# Patient Record
Sex: FEMALE | Race: WHITE
[De-identification: names, ages, dates, MRNs, and addresses within clinical notes are randomized per-mention and may not be internally consistent; named-entity substitution may affect disease eponyms.]

---

## 2018-10-01 ENCOUNTER — HOSPITAL ENCOUNTER (EMERGENCY)
Dept: HOSPITAL 97 - ER | Age: 73
Discharge: ANOTHER HOSPITAL | End: 2018-10-01
Payer: COMMERCIAL

## 2018-10-01 VITALS — OXYGEN SATURATION: 96 % | SYSTOLIC BLOOD PRESSURE: 137 MMHG | DIASTOLIC BLOOD PRESSURE: 53 MMHG

## 2018-10-01 VITALS — TEMPERATURE: 98.2 F

## 2018-10-01 DIAGNOSIS — Z95.818: ICD-10-CM

## 2018-10-01 DIAGNOSIS — Z88.8: ICD-10-CM

## 2018-10-01 DIAGNOSIS — I10: ICD-10-CM

## 2018-10-01 DIAGNOSIS — H81.49: Primary | ICD-10-CM

## 2018-10-01 LAB
ALBUMIN SERPL BCP-MCNC: 3.4 G/DL (ref 3.4–5)
ALP SERPL-CCNC: 99 U/L (ref 45–117)
ALT SERPL W P-5'-P-CCNC: 18 U/L (ref 12–78)
AST SERPL W P-5'-P-CCNC: 16 U/L (ref 15–37)
BUN BLD-MCNC: 67 MG/DL (ref 7–18)
GLUCOSE SERPLBLD-MCNC: 283 MG/DL (ref 74–106)
HCT VFR BLD CALC: 44.9 % (ref 36–45)
LYMPHOCYTES # SPEC AUTO: 2.9 K/UL (ref 0.7–4.9)
MCH RBC QN AUTO: 31.1 PG (ref 27–35)
MCV RBC: 91.1 FL (ref 80–100)
PMV BLD: 9.4 FL (ref 7.6–11.3)
POTASSIUM SERPL-SCNC: 5.1 MMOL/L (ref 3.5–5.1)
RBC # BLD: 4.92 M/UL (ref 3.86–4.86)
UA COMPLETE W REFLEX CULTURE PNL UR: (no result)

## 2018-10-01 PROCEDURE — 81003 URINALYSIS AUTO W/O SCOPE: CPT

## 2018-10-01 PROCEDURE — 96361 HYDRATE IV INFUSION ADD-ON: CPT

## 2018-10-01 PROCEDURE — 87186 SC STD MICRODIL/AGAR DIL: CPT

## 2018-10-01 PROCEDURE — 85025 COMPLETE CBC W/AUTO DIFF WBC: CPT

## 2018-10-01 PROCEDURE — 96374 THER/PROPH/DIAG INJ IV PUSH: CPT

## 2018-10-01 PROCEDURE — 70450 CT HEAD/BRAIN W/O DYE: CPT

## 2018-10-01 PROCEDURE — 36415 COLL VENOUS BLD VENIPUNCTURE: CPT

## 2018-10-01 PROCEDURE — 81015 MICROSCOPIC EXAM OF URINE: CPT

## 2018-10-01 PROCEDURE — 87086 URINE CULTURE/COLONY COUNT: CPT

## 2018-10-01 PROCEDURE — 87088 URINE BACTERIA CULTURE: CPT

## 2018-10-01 PROCEDURE — 83605 ASSAY OF LACTIC ACID: CPT

## 2018-10-01 PROCEDURE — 80053 COMPREHEN METABOLIC PANEL: CPT

## 2018-10-01 PROCEDURE — 99285 EMERGENCY DEPT VISIT HI MDM: CPT

## 2018-10-01 PROCEDURE — 87077 CULTURE AEROBIC IDENTIFY: CPT

## 2018-10-01 NOTE — RAD REPORT
EXAM DESCRIPTION:  CT - Head Brain Wo Cont - 10/1/2018 11:57 am

 

CLINICAL HISTORY:  vertigo

Drowsiness.

 

COMPARISON:  No comparisons

 

TECHNIQUE:  All CT scans are performed using dose optimization technique as appropriate and may inclu
de automated exposure control or mA/KV adjustment according to patient size.

 

FINDINGS:  No intracranial hemorrhage, hydrocephalus or extra-axial fluid collection.No areas of brai
n edema or evidence of midline shift.

 

The paranasal sinuses and mastoids are clear. The calvarium is intact. Vertebral atherosclerosis.

 

IMPRESSION:  No acute intracranial abnormality.

## 2018-10-01 NOTE — ER
Nurse's Notes                                                                                     

 [facility]                                                                

Name: Lulu Fowler                                                                                

Age: 73 yrs                                                                                       

Sex: Female                                                                                       

: 1945                                                                                   

MRN: [id number]                                                                                   

Arrival Date: 10/01/2018                                                                          

Time: 10:15                                                                                       

Account#: [id number]                                                                            

Bed 8                                                                                             

Private MD: Cary Calvo Atiq                                                                  

Diagnosis: Vertigo of central origin                                                              

                                                                                                  

Presentation:                                                                                     

10/01                                                                                             

10:19 Presenting complaint: Patient states: Dizziness and generalized weakness x 3 days.      hb  

      Dizziness is worse when changing positions. Home . Denies pain/SOB/nausea/cough.     

      Transition of care: patient was not received from another setting of care. Onset of         

      symptoms was 2018. Risk Assessment: Do you want to hurt yourself or           

      someone else? Patient reports no desire to harm self or others.                             

10:19 Method Of Arrival: Wheelchair                                                           hb  

10:19 Acuity: JAMAL 3                                                                           hb  

11:56 Initial Sepsis Screen: Does the patient meet any 2 criteria? No. Patient's initial      sv  

      sepsis screen is negative. Does the patient have a suspected source of infection? No.       

      Patient's initial sepsis screen is negative.                                                

11:56 Care prior to arrival: None.                                                            sv  

                                                                                                  

Historical:                                                                                       

- Allergies:                                                                                      

10:21 Statins-Hmg-Coa Reductase Inhibitors;                                                   hb  

- PMHx:                                                                                           

10:21 CAD; Diabetes - IDDM; Gout; Hypertension; Pneumonia;                                    hb  

- PSHx:                                                                                           

10:21 Appendectomy; Tubal ligation; abdominal mass removal; back surgery; Heart stents;       hb  

      Tonsillectomy; cataract surgery bilaterally;                                                

                                                                                                  

- Immunization history:: Adult Immunizations up to date.                                          

- Social history:: Smoking status: Patient/guardian denies using tobacco,                         

  Patient/guardian denies using alcohol, street drugs, The patient lives with family, .           

- Ebola Screening: : No symptoms or risks identified at this time.                                

- Family history:: not pertinent.                                                                 

                                                                                                  

                                                                                                  

Screenin:55 Abuse screen: Denies threats or abuse. Denies injuries from another. Nutritional        sv  

      screening: No deficits noted. Tuberculosis screening: No symptoms or risk factors           

      identified. Fall Risk None identified.                                                      

                                                                                                  

Assessment:                                                                                       

12:10 General: Appears in no apparent distress. uncomfortable, obese, well developed,         sv  

      Behavior is cooperative, appropriate for age, anxious. Pain: Denies pain. Neuro: Level      

      of Consciousness is awake, alert, obeys commands, Oriented to person, place, time,          

      situation, Moves all extremities. Full function Speech is normal, Facial symmetry           

      appears normal, Reports dizziness, since 3 days weakness since 3 days. Cardiovascular:      

      Patient's skin is warm and dry. Pulses are 3+ in right radial artery and left radial        

      artery. Respiratory: Respiratory effort is even, unlabored, Respiratory pattern is          

      regular, symmetrical. Derm: Skin is pink, warm \T\ dry. Musculoskeletal: Range of motion:   

      intact in all extremities.                                                                  

14:05 Reassessment: Patient appears in no apparent distress at this time. No changes from     sv  

      previously documented assessment. Patient and/or family updated on plan of care and         

      expected duration. Pain level reassessed. Patient is alert, oriented x 3, equal             

      unlabored respirations, skin warm/dry/pink.                                                 

15:40 Reassessment: Patient appears in no apparent distress at this time. No changes from     sv  

      previously documented assessment. Patient and/or family updated on plan of care and         

      expected duration. Pain level reassessed. Patient is alert, oriented x 3, equal             

      unlabored respirations, skin warm/dry/pink.                                                 

16:19 Reassessment: Patient appears in no apparent distress at this time. No changes from     sv  

      previously documented assessment. Patient and/or family updated on plan of care and         

      expected duration. Pain level reassessed. Patient is alert, oriented x 3, equal             

      unlabored respirations, skin warm/dry/pink.                                                 

                                                                                                  

Vital Signs:                                                                                      

10:21  / 86; Pulse 73; Resp 16; Temp 98.2; Pulse Ox 100% on R/A; Weight 105.23 kg;      hb  

      Height 5 ft. 3 in. (160.02 cm); Pain 0/10;                                                  

12:15  / 75; Pulse 65; Resp 18; Pulse Ox 96% ;                                          sv  

13:15  / 61; Pulse 77; Resp 18; Pulse Ox 96% ;                                          sv  

14:00  / 59; Pulse 77; Resp 17; Pulse Ox 95% ;                                          sv  

16:19  / 53; Pulse 75; Resp 18; Pulse Ox 96% ;                                          sv  

10:21 Body Mass Index 41.10 (105.23 kg, 160.02 cm)                                            hb  

                                                                                                  

Matt Coma Score:                                                                               

11:39 Eye Response: spontaneous(4). Verbal Response: oriented(5). Motor Response: obeys       ma2 

      commands(6). Total: 15.                                                                     

                                                                                                  

ED Course:                                                                                        

10:15 Patient arrived in ED.                                                                  sb2 

10:16 Cary Calvo MD is Private Physician.                                                sb2 

10:21 Triage completed.                                                                       hb  

10:21 Arm band placed on left wrist.                                                          hb  

11:22 Emily Antonio MD is Attending Physician.                                           ma2 

11:28 Gracie Forde, RN is Primary Nurse.                                                  sv  

11:55 Patient has correct armband on for positive identification. Bed in low position. Door   sv  

      closed. Head of bed elevated.                                                               

11:56 CT completed. Patient tolerated procedure well. Patient moved to CT via stretcher.      sw  

      Patient moved back from CT.                                                                 

11:57 Head Brain Wo Cont CT In Process Unspecified.                                           EDMS

12:15 Initial lab(s) drawn, by me, sent to lab. Inserted saline lock: 22 gauge in left hand,  sv  

      using aseptic technique. Blood collected. Flushed right hand with 5 ml normal saline.       

15:38 CBC with Diff Sent.                                                                     sv  

16:22 No provider procedures requiring assistance completed. Patient transferred, IV remains  sv  

      in place. intact.                                                                           

16:40 transfer transportation to receiving facility.                                          sv  

                                                                                                  

Administered Medications:                                                                         

12:21 Drug: Valium 5 mg Route: PO;                                                            sg  

12:30 Follow up: Response: No adverse reaction                                                sv  

12:22 Drug: NS 0.9% 1000 ml Route: IV; Rate: 1 bolus; Site: right hand;                       sg  

15:38 Follow up: Response: No adverse reaction; IV Status: Completed infusion; IV Intake:     sv  

      1000ml                                                                                      

12:22 Drug: Meclizine 50 mg Route: PO;                                                        sg  

12:30 Follow up: Response: No adverse reaction                                                sv  

15:40 Drug: Rocephin 1 grams Route: IV; Rate: calculated rate; Site: right hand;              sg  

15:43 Follow up: Response: No adverse reaction; IV Status: Completed infusion; IV Intake: 10mlsv  

                                                                                                  

                                                                                                  

Intake:                                                                                           

15:38 IV: 1000ml; Total: 1000ml.                                                              sv  

15:43 IV: 10ml; Total: 1010ml.                                                                sv  

                                                                                                  

Outcome:                                                                                          

14:58 ER care complete, transfer ordered by MD.                                               ma2 

16:22 Transferred by ground EMS to [facility], Transfer form completed.    sv  

      X-rays sent w/ patient. Note:  Report given to Dinora MALCOLM                                   

16:22 Condition: stable                                                                           

16:22 Instructed on the need for transfer.                                                        

18:18 Patient left the ED.                                                                    sv  

                                                                                                  

Addendum:                                                                                         

10/08/2018                                                                                        

     09:30 Addendum: Culture Results: Positive urine culture. Phone call Attempt #1 called [facility]     [facility]

           [facility], pt was discharged from hospital.                                               

                                                                                                  

Signatures:                                                                                       

Dispatcher [employer]                           Gracie Hill, RN                    Onel Rivera RN RN sg Williams, Irene, RN RN iw Warren, Shannon sw Baxter, Heather, RN RN hb Alzahri, Mohammad, MD MD   ma2                                                  

Rosana Horan                               2                                                  

                                                                                                  

**************************************************************************************************

## 2018-10-01 NOTE — EDPHYS
Physician Documentation                                                                           

 [facility]                                                                

Name: Lulu Fowler                                                                                

Age: 73 yrs                                                                                       

Sex: Female                                                                                       

: 1945                                                                                   

MRN: [id number]                                                                                   

Arrival Date: 10/01/2018                                                                          

Time: 10:15                                                                                       

Account#: [id number]                                                                            

Bed 8                                                                                             

Private MD: Cary Calvo Atiq                                                                  

ED Physician Emily Antonio                                                                    

HPI:                                                                                              

10/01                                                                                             

11:39 This 73 yrs old  Female presents to ER via Wheelchair with complaints of Blood ma2 

      Pressure Problem - LOW, Dizziness.                                                          

11:39 The patient presents with sense of spinning. Onset: The symptoms/episode began/occurred ma2 

      suddenly, 3 day(s) ago. Context: occurred while the patient was she gets spinning only      

      when she looks to the right side or turn head to right, that comes in episodes lasting      

      for 1 min and sudden on /off, + nausea, . Associated signs and symptoms: Pertinent          

      positives: nausea, Pertinent negatives: abdominal pain, agitation, ataxia, blurred          

      vision, chest pain, combativeness, confusion, diaphoresis, focal weakness, head injury,     

      near-syncope, numbness, seizure, shortness of breath, syncope, tingling, vomiting.          

      Severity of symptoms: At their worst the symptoms were moderate in the emergency            

      department the symptoms are unchanged. Patient's baseline: Neuro: alert and fully           

      oriented.                                                                                   

                                                                                                  

Historical:                                                                                       

- Allergies:                                                                                      

10:21 Statins-Hmg-Coa Reductase Inhibitors;                                                   hb  

- PMHx:                                                                                           

10:21 CAD; Diabetes - IDDM; Gout; Hypertension; Pneumonia;                                    hb  

- PSHx:                                                                                           

10:21 Appendectomy; Tubal ligation; abdominal mass removal; back surgery; Heart stents;       hb  

      Tonsillectomy; cataract surgery bilaterally;                                                

                                                                                                  

- Immunization history:: Adult Immunizations up to date.                                          

- Social history:: Smoking status: Patient/guardian denies using tobacco,                         

  Patient/guardian denies using alcohol, street drugs, The patient lives with family, .           

- Ebola Screening: : No symptoms or risks identified at this time.                                

- Family history:: not pertinent.                                                                 

                                                                                                  

                                                                                                  

ROS:                                                                                              

11:39 Constitutional: Negative for fever, chills, and weight loss, Cardiovascular: Negative   ma2 

      for chest pain, palpitations, and edema, Respiratory: Negative for shortness of breath,     

      cough, wheezing, and pleuritic chest pain, Abdomen/GI: Negative for abdominal pain,         

      nausea, diarrhea, and constipation, MS/Extremity: Negative for injury and deformity,        

      Skin: Negative for injury, rash, and discoloration.                                         

11:39 Neuro: Positive for vertigo , Negative for altered mental status, gait disturbance,         

      headache, hearing loss, loss of consciousness, seizure activity, speech changes,            

      syncope, tinnitus, tremor.                                                                  

                                                                                                  

Exam:                                                                                             

11:39 Constitutional:  This is a well developed, well nourished patient who is awake, alert,  ma2 

      and in no acute distress. Head/Face:  Normocephalic, atraumatic. Chest/axilla:  Normal      

      chest wall appearance and motion.  Nontender with no deformity.  No lesions are             

      appreciated. Cardiovascular:  Regular rate and rhythm with a normal S1 and S2.  No          

      gallops, murmurs, or rubs.  Normal PMI, no JVD.  No pulse deficits. Respiratory:  Lungs     

      have equal breath sounds bilaterally, clear to auscultation and percussion.  No rales,      

      rhonchi or wheezes noted.  No increased work of breathing, no retractions or nasal          

      flaring. Abdomen/GI:  Soft, non-tender, with normal bowel sounds.  No distension or         

      tympany.  No guarding or rebound.  No evidence of tenderness throughout. MS/ Extremity:     

       Pulses equal, no cyanosis.  Neurovascular intact.  Full, normal range of motion.           

      Neuro:  Awake and alert, GCS 15, oriented to person, place, time, and situation.            

      Cranial nerves II-XII grossly intact.  Motor strength 5/5 in all extremities.  Sensory      

      grossly intact.  Cerebellar exam normal.  Normal gait.                                      

11:39 ENT:  Nares patent. No nasal discharge, no septal abnormalities noted.  Tympanic            

      membranes are normal and external auditory canals are clear.  Oropharynx with no            

      redness, swelling, or masses, exudates, or evidence of obstruction, uvula midline.          

      Mucous membranes moist.                                                                     

11:39 Constitutional:  This is a well developed, well nourished patient who is awake, alert,      

      and in no acute distress. Neuro:  Awake and alert, GCS 15, oriented to person, place,       

      time, and situation.  Cranial nerves II-XII grossly intact.  Motor strength 5/5 in all      

      extremities.  Sensory grossly intact.  Cerebellar exam normal.  Normal gait.                

11:39 Constitutional: The patient appears alert.                                                  

11:39 Neuro: no nystagmus, strength wnl coordination wnl .                                        

                                                                                                  

Vital Signs:                                                                                      

10:21  / 86; Pulse 73; Resp 16; Temp 98.2; Pulse Ox 100% on R/A; Weight 105.23 kg;      hb  

      Height 5 ft. 3 in. (160.02 cm); Pain 0/10;                                                  

12:15  / 75; Pulse 65; Resp 18; Pulse Ox 96% ;                                          sv  

13:15  / 61; Pulse 77; Resp 18; Pulse Ox 96% ;                                          sv  

14:00  / 59; Pulse 77; Resp 17; Pulse Ox 95% ;                                          sv  

16:19  / 53; Pulse 75; Resp 18; Pulse Ox 96% ;                                          sv  

10:21 Body Mass Index 41.10 (105.23 kg, 160.02 cm)                                            hb  

                                                                                                  

[place] Coma Score:                                                                               

11:39 Eye Response: spontaneous(4). Verbal Response: oriented(5). Motor Response: obeys       ma2 

      commands(6). Total: 15.                                                                     

                                                                                                  

MDM:                                                                                              

11:22 Patient medically screened.                                                             ma2 

11:39 Differential diagnosis: head injury, hyperventilation, hypovolemia, vertigo.            [facility] 

14:55 Data reviewed: vital signs, nurses notes. Counseling: I had a detailed discussion with  ma2 

      the patient and/or guardian regarding: the historical points, exam findings, and any        

      diagnostic results supporting the discharge/admit diagnosis, the need to transfer to        

      another facility. Response to treatment: There is no appreciated change of the              

      patient's symptoms at this time, she had mild improvement, unable to walk, will need        

      MRI and neurology evaluation, neuro not available at this hospital today, will initiate     

      emergency transfer for higher level of care, .                                              

                                                                                                  

10/01                                                                                             

11:38 Order name: CBC with Diff                                                               ma2 

10/01                                                                                             

11:38 Order name: CMP; Complete Time: 14:25                                                   ma2 

10/01                                                                                             

11:38 Order name: Lactate; Complete Time: 14:25                                               ma2 

10/01                                                                                             

11:39 Order name: CBC with Automated Diff; Complete Time: 14:25                               EDMS

10/01                                                                                             

14:37 Order name: Urine Dipstick--Ancillary (enter results); Complete Time: 15:15             em1 

10/01                                                                                             

11:38 Order name: Head Brain Wo Cont CT; Complete Time: 14:25                                 ma2 

10/01                                                                                             

14:40 Order name: Urine Microscopic Only; Complete Time: 15:15                                em1 

10/01                                                                                             

14:40 Order name: Urine Culture                                                               em 

                                                                                                  

Administered Medications:                                                                         

12:21 Drug: Valium 5 mg Route: PO;                                                            sg  

12:30 Follow up: Response: No adverse reaction                                                sv  

12:22 Drug: NS 0.9% 1000 ml Route: IV; Rate: 1 bolus; Site: right hand;                       sg  

15:38 Follow up: Response: No adverse reaction; IV Status: Completed infusion; IV Intake:     sv  

      1000ml                                                                                      

12:22 Drug: Meclizine 50 mg Route: PO;                                                        sg  

12:30 Follow up: Response: No adverse reaction                                                sv  

15:40 Drug: Rocephin 1 grams Route: IV; Rate: calculated rate; Site: right hand;              sg  

15:43 Follow up: Response: No adverse reaction; IV Status: Completed infusion; IV Intake: 10mlsv  

                                                                                                  

                                                                                                  

Disposition:                                                                                      

10/01/18 14:58 Transfer ordered to [facility]. Diagnosis is Vertigo of        

  central origin.                                                                                 

- Reason for transfer: Higher level of care.                                                      

- Accepting physician is accepted by [facility] .                                      

- Condition is Critical.                                                                          

- Problem is new.                                                                                 

- Symptoms are unchanged.                                                                         

                                                                                                  

                                                                                                  

                                                                                                  

Signatures:                                                                                       

Dispatcher [employer]                           [facility]                                                 

Gracie Forde RN RN sv Gay, Steven, RN RN sg Baxter, Heather, RN RN                                                      

Emily Antonio MD MD   ma2                                                  

                                                                                                  

Corrections: (The following items were deleted from the chart)                                    

15:01 11:39 URINALYSIS+U.LAB.BRZ ordered. [facility]

15:12 14:58 10/01/2018 14:58 Transfer ordered to [facility]. Diagnosis is ma2 

      Vertigo of central origin. Reason for transfer: Higher level of care. Accepting             

      physician is na. Condition is Critical. Problem is new. Symptoms are unchanged. ma2         

18:18 15:12 10/01/2018 14:58 Transfer ordered to [facility]. Diagnosis is sv  

      Vertigo of central origin. Reason for transfer: Higher level of care. Accepting             

      physician is accepted by [facility] . Condition is Critical. Problem is new.     

      Symptoms are unchanged. ma2                                                                 

                                                                                                  

**************************************************************************************************

## 2020-06-18 LAB
BUN BLD-MCNC: 64 MG/DL (ref 7–18)
GLUCOSE SERPLBLD-MCNC: 81 MG/DL (ref 74–106)
HCT VFR BLD CALC: 41 % (ref 36–45)
INR BLD: 1.21
LYMPHOCYTES # SPEC AUTO: 2.4 K/UL (ref 0.7–4.9)
PMV BLD: 8.6 FL (ref 7.6–11.3)
POTASSIUM SERPL-SCNC: 4.2 MMOL/L (ref 3.5–5.1)
RBC # BLD: 4.6 M/UL (ref 3.86–4.86)

## 2020-06-18 NOTE — RAD REPORT
EXAM DESCRIPTION:  Brad Pedroza And Melchor (2 Views)6/18/2020 4:27 pm

 

CLINICAL HISTORY:  Preop for cardiac catheterization/hypertension

 

COMPARISON:  2018

 

FINDINGS:   The lungs appear clear of acute infiltrate. The heart is borderline enlarged

 

Small bilateral pleural effusions

## 2020-06-19 ENCOUNTER — HOSPITAL ENCOUNTER (INPATIENT)
Dept: HOSPITAL 97 - CCL | Age: 75
LOS: 1 days | Discharge: HOME HEALTH | DRG: 251 | End: 2020-06-20
Attending: INTERNAL MEDICINE | Admitting: INTERNAL MEDICINE
Payer: COMMERCIAL

## 2020-06-19 VITALS — BODY MASS INDEX: 41.3 KG/M2

## 2020-06-19 DIAGNOSIS — I25.2: ICD-10-CM

## 2020-06-19 DIAGNOSIS — Z79.01: ICD-10-CM

## 2020-06-19 DIAGNOSIS — Z86.718: ICD-10-CM

## 2020-06-19 DIAGNOSIS — E11.22: ICD-10-CM

## 2020-06-19 DIAGNOSIS — Z98.51: ICD-10-CM

## 2020-06-19 DIAGNOSIS — I50.32: ICD-10-CM

## 2020-06-19 DIAGNOSIS — Z79.4: ICD-10-CM

## 2020-06-19 DIAGNOSIS — R80.9: ICD-10-CM

## 2020-06-19 DIAGNOSIS — Z90.49: ICD-10-CM

## 2020-06-19 DIAGNOSIS — E11.51: ICD-10-CM

## 2020-06-19 DIAGNOSIS — J44.9: ICD-10-CM

## 2020-06-19 DIAGNOSIS — Z88.8: ICD-10-CM

## 2020-06-19 DIAGNOSIS — Z79.899: ICD-10-CM

## 2020-06-19 DIAGNOSIS — Z91.041: ICD-10-CM

## 2020-06-19 DIAGNOSIS — Z87.891: ICD-10-CM

## 2020-06-19 DIAGNOSIS — E78.5: ICD-10-CM

## 2020-06-19 DIAGNOSIS — I25.110: Primary | ICD-10-CM

## 2020-06-19 DIAGNOSIS — N18.4: ICD-10-CM

## 2020-06-19 DIAGNOSIS — Z95.5: ICD-10-CM

## 2020-06-19 DIAGNOSIS — N17.9: ICD-10-CM

## 2020-06-19 DIAGNOSIS — I13.0: ICD-10-CM

## 2020-06-19 LAB
UA COMPLETE W REFLEX CULTURE PNL UR: (no result)
UA DIPSTICK W REFLEX MICRO PNL UR: (no result)

## 2020-06-19 PROCEDURE — B2111ZZ FLUOROSCOPY OF MULTIPLE CORONARY ARTERIES USING LOW OSMOLAR CONTRAST: ICD-10-PCS

## 2020-06-19 PROCEDURE — 4A023N7 MEASUREMENT OF CARDIAC SAMPLING AND PRESSURE, LEFT HEART, PERCUTANEOUS APPROACH: ICD-10-PCS

## 2020-06-19 PROCEDURE — 80048 BASIC METABOLIC PNL TOTAL CA: CPT

## 2020-06-19 PROCEDURE — 84550 ASSAY OF BLOOD/URIC ACID: CPT

## 2020-06-19 PROCEDURE — 85730 THROMBOPLASTIN TIME PARTIAL: CPT

## 2020-06-19 PROCEDURE — 82947 ASSAY GLUCOSE BLOOD QUANT: CPT

## 2020-06-19 PROCEDURE — 71046 X-RAY EXAM CHEST 2 VIEWS: CPT

## 2020-06-19 PROCEDURE — 85347 COAGULATION TIME ACTIVATED: CPT

## 2020-06-19 PROCEDURE — 85610 PROTHROMBIN TIME: CPT

## 2020-06-19 PROCEDURE — 92920 PRQ TRLUML C ANGIOP 1ART&/BR: CPT

## 2020-06-19 PROCEDURE — 93458 L HRT ARTERY/VENTRICLE ANGIO: CPT

## 2020-06-19 PROCEDURE — 85025 COMPLETE CBC W/AUTO DIFF WBC: CPT

## 2020-06-19 PROCEDURE — 36415 COLL VENOUS BLD VENIPUNCTURE: CPT

## 2020-06-19 PROCEDURE — 81015 MICROSCOPIC EXAM OF URINE: CPT

## 2020-06-19 PROCEDURE — 81003 URINALYSIS AUTO W/O SCOPE: CPT

## 2020-06-19 PROCEDURE — 02703ZZ DILATION OF CORONARY ARTERY, ONE ARTERY, PERCUTANEOUS APPROACH: ICD-10-PCS

## 2020-06-19 RX ADMIN — INSULIN GLARGINE SCH UNITS: 100 INJECTION, SOLUTION SUBCUTANEOUS at 21:11

## 2020-06-19 RX ADMIN — APIXABAN SCH: 2.5 TABLET, FILM COATED ORAL at 18:51

## 2020-06-19 NOTE — P.CNS
Date of Consult: 06/19/20


Reason for Consult: CHEN/ CKD


Requesting Physician: Victor Manuel Monge


Chief Complaint: Chest Pain


History of Present Illness: 





76 yo WF CKD, HTN admitted to the hospital following a cardiac catheterization 

earlier today which required angioplasty.  She is followed by Dr. Duckworth for 

known CKD.  She was given NAC prior to the procedure.  No difficulty urinating. 

No NSAIDs.  Case reviewed with Dr. Monge.


Allergies





iv contrast Allergy (Uncoded 06/18/20 16:12)


   Rash


Statins-Hmg-Coa Reductase Allergy (Uncoded 06/18/20 16:12)


   Unknown





Home medications list reviewed: Yes


Home Medications: 








Insulin Detemir [Levemir] 30 units SQ BID 01/28/16 


Furosemide [Lasix*] 40 mg PO DAILY #30 05/14/17 


Insulin Lispro [Humalog] 10 units SQ DAILY PRN 01/10/18 


Acetylcysteine [Nac] 1 tab PO BID 06/19/20 


Amlodipine [Norvasc*] 1 tab PO DAILY 06/19/20 


Apixaban [Eliquis *] 2.5 mg PO DAILY 06/19/20 


Levofloxacin [Levaquin] 1 tab PO DAILY 06/19/20 


carvediloL [Carvedilol] 1 tab PO BID 06/19/20 








- Past Medical/Surgical History


Diabetic: Yes


-: Diabetes


-: CKD


-: CHF


-: CAD


-: DVT


-: bilateral occluded cartoid artery


-: CAD, stent placement


-: Chronic pleural effusion


-: CHF, diastolic dysfunction


-: Former smoker


-: COPD


-: Cardiac catheterization with stent placed


-: Appendectomy


-: Tubal ligation


-: abdominal mass


-: Back surgery


-: Cataract surgery


-: Tonsillectomy


Psychosocial/ Personal History: The patient lives with grandson.  Patient is 

.  She has 3 children.





- Family History


  ** Father


Medical History: Heart disease





  ** Mother


Medical History: Heart disease





- Social History


Alcohol use: No


CD- Drugs: No


Caffeine use: Yes





Review of Systems


10-point ROS is otherwise unremarkable


General: Weakness, Malaise


Respiratory: SOB with Excertion


Cardiovascular: Edema





Physical Examination














Temp Pulse Resp BP Pulse Ox


 


 97.6 F   89   16   143/70 H  97 


 


 06/19/20 20:00  06/19/20 21:11  06/19/20 20:00  06/19/20 21:11  06/19/20 20:00








General: In no apparent distress, Oriented x3, Cooperative


HEENT: Atraumatic


Neck: Supple


Respiratory: Clear to auscultation bilaterally


Cardiovascular: Regular rate/rhythm, Edema


Gastrointestinal: Soft and benign, Non-distended


Musculoskeletal: No clubbing, No contractures


Integumentary: No rashes, No cyanosis


Neurological: Normal speech


Blood work reviewed in the chart.


Imagings Data: 


EXAM DESCRIPTION:  Brad Pedroza And Melchor (2 Views)6/18/2020 4:27 pm


CLINICAL HISTORY:  Preop for cardiac catheterization/hypertension


COMPARISON:  2018


FINDINGS:   The lungs appear clear of acute infiltrate. The heart is borderline 

enlarged


Small bilateral pleural effusions


Conclusions/Impression: 


A/


CHEN in the setting of ALEKSEY.


CKD IV with proteinuria.


HTN with CKD/ CHF.


Diastolic CHF, chronic.


DM II with CKD.


BL Pleural Effusion.





P/ Continue current POC and Medications.


Change IVF 1/2NS.


Continue NAC.


Restart home medications as indicated.


No NSAIDs.


AM labs.  Daily weight.





Thank you kindly for the consultation.

## 2020-06-19 NOTE — XMS REPORT
Clinical Summary

                            Created on:2020



Patient:Lulu Hdez

Sex:Female

:1945

External Reference #:[id number]





Demographics







                          Address                   [address] [place] [address]

 

                          Home Phone                [phone]

 

                          Preferred Language        English

 

                          Marital Status            Unknown

 

                          Jew Affiliation     Unknown

 

                          Race                      White

 

                          Ethnic Group              Not  or 









Author







                          Organization              [facility]

 

                          Address                   [address] [place] [address]









Support







                Name            Relationship    Address         Phone

 

                Lashawn Kiran     Unavailable     Unavailable     [phone]

 

                Romaine Richards      Unavailable     [phone]









Care Team Providers







                    Name                Role                Phone

 

                    Cary Calvo MD Primary Care Provider [phone]









Allergies







             Active Allergy Reactions    Severity     Noted Date   Comments

 

             Statins-Hmg-Coa Other (See Comments)              2015   Unkn

own. Pt states



             Reductase Inhibitors                                        she is 

allergic to



                                                                 statins and was

 told



                                                                 by doctor not t

o take



                                                                 them. States do

es not



                                                                 know the reacti

ons.







Medications







          Medication Sig       Dispensed Refills   Start Date End Date  Status

 

          insulin lispro Inject              0                             Activ

e



          (HUMALOG) 100 subcutaneously 3                                        

 



          unit/mL   (three) times daily                                         



          injection before meals.                                         

 

          apixaban  Take 2.5 mg by           0                             Activ

e



          (ELIQUIS) 2.5 mg mouth 2 (two) times                                  

       



          Tab       daily.                                            



          tabletIndication                                                   



          s: heart stents                                                   



          hx                                                          

 

          allopurinol Take 300 mg by           0                             Act

brandon



          (ZYLOPRIM) 300 mouth daily.                                         



          MG tablet                                                   

 

          furosemide Take 40 mg by mouth           0                            

 Active



          (LASIX) 40 MG daily.                                            



          tablet                                                      

 

          carvedilol Take 12.5 mg by           0                             Act

brandon



          (COREG) 12.5 MG mouth 2 (two) times                                   

      



          tablet    daily with                                         



                    breakfast and                                         



                    dinner.                                           

 

                          insulin detemir           Inject 40 Units subcutaneous

ly 2 (two) times daily 35 units in 

AM           10 mL        0            2020                Active



          U-100 (LEVEMIR)                                                   



          100 unit/mL 35 units at night .                                       

  



          injectionIndicat                                                   



          ions: type 2                                                   



          diabetes                                                    



          mellitus, per                                                   



          glucose reading                                                   

 

                          insulin detemir           Inject 35 Units subcutaneous

ly 2 (two) times daily 35 units in 

AM                        0                             Discontinued



          U-100 (LEVEMIR)                                         0         



          100 unit/mL 35 units at night .                                       

  



          injectionIndicat                                                   



          ions: type 2                                                   



          diabetes                                                    



          mellitus, per                                                   



          glucose reading                                                   

 

          amLODIPine Take 2.5 mg by           0         2020 Exp

ired



          (NORVASC) 2.5 MG mouth daily.                               0         



          tablet                                                      







Active Problems







                          Problem                   Noted Date

 

                          Dyspnea                   2020

 

                          Stage 2 chronic kidney disease 10/03/2018

 

                          UTI (urinary tract infection) 10/03/2018

 

                          Hypertension              10/03/2018

 

                          Diabetes mellitus, type 2 10/02/2018

 

                          Essential hypertension    10/02/2018

 

                          CAD (coronary artery disease) 10/02/2018

 

                          Obesity                   10/02/2018

 

                          Vertigo                   10/01/2018







Encounters







             Date         Type         Specialty    Care Team    Description

 

             2020   Hospital Encounter Cardiology   [place]ata, Dyspnea,

 unspecified 

type;



                                                                MD Viki



                                        Essential hypertension;



                                                    Teddy Valerio, Stage 2

 chronic kidney disease;



                                                                MD



                                        Type 2 diabetes mellitus with chronic ki

dney disease, with long-term current 

use of insulin, unspecified CKD stage (HCC);



                                                    Moon Cox Hyperglycem

ia due to diabetes mellitus (HCC);



                                                    MD Sita   Chronic diastol

ic congestive heart failure (HCC)

 

             2020   Travel                                 



after 2019



Social History







             Tobacco Use  Types        Packs/Day    Years Used   Date

 

             Never Smoker                                        









                Smokeless Tobacco: Never Used                                 









                Alcohol Use     Drinks/Week     oz/Week         Comments

 

                No                                              









                    Alcohol Habits      Answer              Date Recorded

 

                    How often do you have a drink containing alcohol? Never     

          2020

 

                    How many drinks containing alcohol do you have on a typical 

Not asked           



                    day when you are drinking?                     

 

                    How often do you have six or more drinks on one occasion? No

t asked           









                          Sex Assigned at Birth     Date Recorded

 

                          Not on file               









                    Job Start Date      Occupation          Industry

 

                    Not on file         Not on file         Not on file









                    Travel History      Travel Start        Travel End









                                        No recent travel history available.







Last Filed Vital Signs







                    Vital Sign          Reading             Time Taken

 

                    Blood Pressure      127/58              2020 12:30 PM 

CDT

 

                    Pulse               73                  2020 12:30 PM 

CDT

 

                    Temperature         36.4 C (97.5 F) 2020 12:30 PM 

CDT

 

                    Respiratory Rate    18                  2020 12:30 PM 

CDT

 

                    Oxygen Saturation   96%                 2020 12:30 PM 

CDT

 

                    Inhaled Oxygen Concentration -                   -

 

                    Weight              103.8 kg (228 lb 12.8 oz) 2020  7:

20 AM CDT

 

                    Height              160 cm (5' 3")      2020  7:20 AM 

CDT

 

                    Body Mass Index     40.53               2020  7:20 AM 

CDT







Plan of Treatment

Not on file



Procedures







             Procedure Name Priority     Date/Time    Associated   Comments



                                                    Diagnosis    

 

             RHYTHM STRIP - SCAN              2020 12:50              



                                       PM CDT                    

 

             REPORT OF PROCEDURE -              2020 10:40              



             ENDOSCOPY SCAN              AM CDT                    

 

             RHYTHM STRIP - SCAN              2020 10:40              



                                       AM CDT                    

 

             ECHOCARDIOGRAM REPORT -              2020  9:21              



             SCAN                      PM CDT                    

 

             POCT-GLUCOSE METER Routine      2020 12:26              Resul

ts for this



                                       PM CDT                    procedure are i

n



                                                                 the results



                                                                 section.

 

             2D ECHO W/ DOPPLER Routine      2020 10:31              Resul

ts for this



             (CW/PW/COLOR)              AM CDT                    procedure are 

in



                                                                 the results



                                                                 section.

 

             POCT-GLUCOSE METER Routine      2020  7:38              Resul

ts for this



                                       AM CDT                    procedure are i

n



                                                                 the results



                                                                 section.

 

             BLOOD GAS, ARTERIAL Routine      2020  4:46              Resu

lts for this



                                       AM CDT                    procedure are i

n



                                                                 the results



                                                                 section.

 

             XR CHEST 1 VIEW Routine      2020  3:57              Results 

for this



             PORTABLE/BEDSIDE              AM CDT                    procedure a

re in



                                                                 the results



                                                                 section.

 

             B-TYPE NATRIURETIC STAT         2020  3:26              Resul

ts for this



             FACTOR (BNP)              AM CDT                    procedure are i

n



                                                                 the results



                                                                 section.

 

             D-DIMER      Routine      2020  3:26              Results for

 this



                                       AM CDT                    procedure are i

n



                                                                 the results



                                                                 section.

 

             CBC (HEMOGRAM ONLY) Routine      2020  3:26              Resu

lts for this



                                       AM CDT                    procedure are i

n



                                                                 the results



                                                                 section.

 

             MAGNESIUM    Routine      2020  3:26              Results for

 this



                                       AM CDT                    procedure are i

n



                                                                 the results



                                                                 section.

 

             BASIC METABOLIC PANEL Routine      2020  3:26              Re

sults for this



             (7)                       AM CDT                    procedure are i

n



                                                                 the results



                                                                 section.



after 2019



Results

RHYTHM STRIP - SCAN (2020 12:50 PM CDT)Only the most recent of2 results
within the time period is included.





                          Narrative                 Performed At

 

                                        This result has an attachment that is no

t available.



                                        



EKG-SCANNED (2020 10:40 AM CDT)





                          Narrative                 Performed At

 

                                        This result has an attachment that is no

t available.



                                        



ECHOCARDIOGRAM REPORT - SCAN (2020  9:21 PM CDT)





                          Narrative                 Performed At

 

                                        This result has an attachment that is no

t available.



                                        



POC-Glucose meter (2020 12:26 PM CDT)Only the most recent of2 results
within the time period is included.





                Component       Value           Ref Range       Performed At

 

                POC-Glucose Meter 374 (H)Comment: : Notified 70 - 110 mg/dL  [facility]



                                RN/MD: TESTED AT [facility]                 MEDICAL C

ENTER



                                [address] [place] [address],                 



                                [id number]: /Technician                 



                                ID = [id number] for ROBERT CAMARENA                       









                                        Specimen

 

                                        Blood









                Performing Organization Address         City/State/Zipcode Phone

 Number

 

                [facility] MEDICAL [address] [place] [address]

 [id number] [phone]



                CENTER                                          



2D Echo W/Doppler(CW/PW/Color) (2020 10:31 AM CDT)





                Component       Value           Ref Range       Performed At

 

                Ejection Fraction                                 [facility] ECHO HEAR

[employer] [employer] [facility]









                                        Specimen

 

                                        









                          Narrative                 Performed At

 

                                        Transthoracic Echocardiography Report (T

TE)



                                        [facility] ECHO HEARTLAB [employer] [facility]



                                         



                                        



                                         Demographics



                                        



                                         



                                        



                           Patient Name MIRIAN HDEZate of 



                                        Study 2020



                                        



                                         



                                        



                           [id number] 



                                         GenderFem

loli



                                        



                                         



                                        



                           Visit Number [id number] 



                                        [employer]





                                        



                                         



                                        



                           [id number] 



                                        m Number [id number]



                                        



                                         Number



                                        



                                         



                                        



                           Date of Birth1945 Referring 



                                        Physician Viki Ott



                                        



                                         



                                        



                           Age76 year(s) 



                                        Sonographer Curry Godinez ms CS



                                        



                                         



                                        



                           Soni Guidry,Gilberto Christy MD



                                        



                          [facility] 



                                         Physician



                                        



                                         



                                        



                                        Procedure



                                        



                                         



                                        



                                         Type of



                                        



                           Study TTE procedure:2DECHO W 



                                        DOPPLER(CW/PW/COLOR) (Routine)



                                        



                                         



                                        



                                        Indications:Shortness of breath.



                                        



                                         



                                        



                                        Clinical History



                                        



                                        HGB 14.6



                                        



                                        HCT 45.3 %



                                        



                                        COPD, CKD, DM, HF, CVA



                                        



                                         



                                        



                                        Contrast Medium: Definity.



                                        



                                         



                                        



                          Height: 63 inches Weight: 103.42 kg (228 lbs) BSA: 



                                        2.04 m^2 BMI: 40.39



                                        



                                        kg/m^2



                                        



                                         



                                        



                                        HR: 92 bpm BP: 144/69 mmHg



                                        



                                         



                                        



                                         Summary



                                        



                           1. The left ventricle is chamber size (by vol 



                                        index) is moderately



                                        



                           enlarged (female - LVED vol -71-80ml/m2). LV 



                                        endocardium is adequately



                                        



                           visualized with IV ultrasound enhancing agent. 



                                        Mild concentric LVH noted.



                                        



                           The following segment(s) appear akinetic: 



                                        inferior wall, basal/mid



                                        



                           infereolateral. The other segments are 



                                        hypokinetic. Global LV systolic



                                        



                           function is moderately reduced . LVEF by 



                                        Coy's method of disk



                                        



                           assessment is moderately reduced (30-34%) . 



                                        Diastolic function is



                                        



                                         indeterminate.



                                        



                                         



                                        



                           2. The right ventricular chamber size and 



                                        systolic function are within



                                        



                                         normal limits. S' 11 cm/sec.



                                        



                                         



                                        



                           3. LA size is at the upper limit of normal 



                                        indexed to BSA. RA size is



                                        



                                         mildly dilated.



                                        



                                         



                                        



                           4. A trace of tricuspid regurgitation. Peak 



                                        systolic pressure may be



                                        



                           underestimated; partial TR signal. Estimated peak 



                                        systolic PA pressure is



                                        



                           at least 30-35 mmHg. The estimated RA pressure by 



                                        IVC dynamics 5-10mmHg .



                                        



                                         



                                        



                                         Previous Study



                                        



                                         No prior studies available for comparis

on.



                                        



                                         



                                        



                                         Signature



                                        



                                         



                                        



                           ------------------------------------------------- 



                                        ---------------



                                        



                           Electronically signed by Pamela Christy MD(Interpreting



                                        



                                         physician) on 2020 04:04 PM



                                        



                           ------------------------------------------------- 



                                        ---------------



                                        



                                         



                                        



                                         Findings



                                        



                                         



                                        



                                        Technical Quality: Technically adequate 

exam.



                                        



                                         



                                        



                           Left Ventricle The left ventricle 



                                        is chamber size (by vol index)



                                        



                          is 



                                        moderately enlarged (female - LVED vol



                                        



                          -7 



                                        1-80ml/m2). LV endocardium is adequately



                                        



                          vi 



                                        sualized with IV ultrasound enhancing ag

ent. Mild



                                        



                          co 



                                        ncentric LVH noted. The following segmen

t(s)



                                        



                          ap 



                                        pear akinetic: inferior wall, basal/mid



                                        



                          in 



                                        fereolateral. The other segments are hyp

okinetic.



                                        



                          Gl 



                                        obal LV systolic function is moderately 

reduced .



                                        



                          LV 



                                        EF by Coy's method of disk assessmen

t is



                                        



                          mo 



                                        derately reduced (30-34%) . Diastolic fu

nction is



                                        



                          in 



                                        determinate.



                                        



                                         



                                        



                           Left AtriumLA size is at 



                                        the upper limit of normal indexed to



                                        



                          BS 



                                        A.



                                        



                                         



                                        



                           Right VentricleThe right 



                                        ventricular chamber size and systolic



                                        



                          fu 



                                        nction are within normal limits. S' 11 c

m/sec.



                                        



                                         



                                        



                           Right Atrium RA size is 



                                        mildly dilated.



                                        



                                         



                                        



                           Aortic Valve Mild AoV cusp 



                                        thickening.



                                        



                          Mi 



                                        ld aortic regurgitation.



                                        



                          No 



                                        evidence of aortic stenosis.



                                        



                                         



                                        



                           Mitral Valve Mild MV leaflet 



                                        thickening.



                                        



                          Mi 



                                        ld mitral annular calcification.



                                        



                          Mi 



                                        ld mitral regurgitation.



                                        



                                         



                                        



                           Tricuspid ValveTV structure is 



                                        normal.



                                        



                          A 



                                        trace of tricuspid regurgitation. Peak s

ystolic



                                        



                          pr 



                                        essure may be underestimated; partial TR

 signal.



                                        



                          Es 



                                        timated peak systolic PA pressure is at 

least



                                        



                          30 



                                        -35 mmHg.



                                        



                                         



                                        



                           Pulmonic Valve Normal PV 



                                        structure and function by limited views



                                        



                          an 



                                        d Doppler.



                                        



                                         



                                        



                           AortaAortic 



                                        root size (SInus of Valsalva diameter) i

s



                                        



                          no 



                                        rmal .



                                        



                          Pr 



                                        oximal ascending aorta size is normal .



                                        



                                         



                                        



                           PericardiumA trivial 



                                        pericardial effusion is present .



                                        



                                         



                                        



                           IVC/SVC/PA/PV/PleuralThe estimated RA 



                                        pressure by IVC dynamics 5-10mmHg



                                        



                                        

.



                                        



                                         



                                        



                                        Chambers/Structures



                                        



                                         



                                        



                                         Left Atrium



                                        



                                         



                                        



                           LA Dimension: 3.8        



                          cm LA Area: 



                                        18.23 cm^2



                                        



                                         LA Volume: 70 ml



                                        



                                         LA Vol. Index: 34 ml/m^2



                                        



                                         



                                        



                                         Left Ventricle



                                        



                                         



                                        



                                         LVIDd: 5.42 cm



                                        



                                         LVIDs: 4.71 cm



                                        



                                         LV Septum Diastolic: 1.19 cm



                                        



                           LV PW Diastolic: 1.22    



                          cm LV FS: 



                                        13.1 %



                                        



                                         LVEDV Coy's:154 ml



                                        



                           LVESV Coy's:103      



                          ml 



                                        LVEDVI: 75 ml/m^2



                                        



                           LVEF Coy's: 32       



                          % 



                                        LVESVI: 50 ml/m^2



                                        



                                         



                                        



                                         LVOT Diameter: 2.02 cm



                                        



                                         



                                        



                                         Right Atrium



                                        



                                         



                                        



                                         RA Area: 18 cm^2



                                        



                                         RA Vol. (Sngl Plane): 5

0 ml



                                        



                                         



                                        



                                         Right Ventricle



                                        



                                         



                                        



                                         RV Diast Dim.: 3.6 cm



                                        



                           



                                         RV Systolic Pressure: 34.72 m

mHg



                                        



                           



                                         TAPSE: 1.8 cm



                                        



                                         



                                        



                                        Aorta



                                        



                                         



                                        



                           Ao Root S of [place].: 3     



                          cmAscending 



                                        Aorta: 3 cm



                                        



                                         



                                        



                                        Doppler/Quantitative Measurements



                                        



                                         



                                        



                                         Mitral Valve



                                        



                                         



                                        



                           MV Peak E-Wave: 1.15     



                          m/sMV Peak A-Wave: 



                                        1.34 m/s



                                        



                           



                                         E/A Ratio: 0.

86



                                        



                           



                           Peak Gradient: 5.26 



                                        mmHg



                                        



                           



                           Deceleration Time: 



                                        137.6 msec



                                        



                                         



                                        



                                         MV Reggie. Peak:



                                        



                                         



                                        



                                         Tissue Doppler



                                        



                                         



                                        



                           E' Lateral Velocity: 0.04 m/s 



                                        E/E': 27.72



                                        



                                         



                                        



                                         Aortic Valve



                                        



                                         



                                        



                           Peak Velocity: 1.47      



                          m/sMean 



                                        Velocity: 1.07 m/s



                                        



                           Peak Gradient: 8.67      



                          mmHg Mean 



                                        Gradient: 5.15 mmHg



                                        



                                         AV Area (continuity): 2.31 cm^2



                                        



                                         AV VTI: 31.3 cm



                                        



                                         



                                        



                                         AV DVI: 0.72



                                        



                                         



                                        



                                         LVOT



                                        



                                         



                                        



                           Peak Velocity: 0.95 m/s 



                                        Peak Gradient: 3.63 mmHg



                                        



                           Mean Velocity: 0.68 m/s 



                                        Mean Gradient: 2.08 mmHg



                                        



                           LVOT Diameter: 2.02      



                                        cmLVOT VTI: 

22.6 cm



                                        



                           LVOT Area: 3.2           



                          cm^2 LVOT SV:72.39 



                                        ml



                                        



                           LVOT CO: 6.66            



                          l/min LVOT CI: 



                                        3.26 l/min/m^2



                                        



                                         



                                        



                                         Tricuspid Valve



                                        



                                         



                                        



                                         Estimated RAP: 10 mmHg



                                        



                                         TR Velocity: 2.49 m/s



                                        



                                         TR Gradient: 24.72 mmHg



                                        



                                         



                                        



                                         Pulmonic Valve



                                        



                                         



                                        



                                        Estimated PASP: 34.7

2 mmHg



                                        



                                                    









                                        Procedure Note

 

                                        Interface, External Ris In - 2020 

 4:04 PM CDT



Transthoracic Echocardiography Report (TTE)



                                        



                                         Demographics



                                        



                                         Patient Name   LULU HDEZ    Date of

 Study       2020



                                        



                                         MRN            [id number]         Gender 

             Female



                                        



                                         Visit Number   [id number]       [facility]   

             



                                        



                                         Accession      [id number]        Room Nu

[facility]         [id number]



                                         Number



                                        



                                         Date of Birth  1945       Refer[place]

 Physician Viki Ott



                                        



                                         Age            75 year(s)       Sonogra

pher         Curry Liang RDCS



                                        



                                         Analyst        Claire Guidry,    Interpr

eting        Pamela Christy MD



                                                        RDCS             Physici

an



                                        



                                        Procedure



                                        



                                         Type of



                                         Study     TTE procedure:2DECHO W DOPPLE

R(CW/PW/COLOR) (Routine)



                                        



                                        Indications:Shortness of breath.



                                        



                                        Clinical History



                                        HGB 14.6



                                        HCT 45.3 %



                                        COPD, CKD, DM, HF, CVA



                                        



                                        Contrast Medium: Definity.



                                        



                                        Height: 63 inches Weight: 103.42 kg (228

 lbs) BSA: 2.04 m^2 BMI: 40.39



                                        kg/m^2



                                        



                                        HR: 92 bpm BP: 144/69 mmHg



                                        



                                         Summary



                                         1. The left ventricle is chamber size (

by vol index) is moderately



                                         enlarged (female - LVED vol -71-80ml/m2

). LV endocardium is adequately



                                         visualized with IV ultrasound enhancing

 agent. Mild concentric LVH noted.



                                         The following segment(s) appear akineti

c: inferior wall, basal/mid



                                         infereolateral. The other segments are 

hypokinetic. Global LV systolic



                                         function is moderately reduced . LVEF b

y Coy's method of disk



                                         assessment is moderately reduced (30-34

%) . Diastolic function is



                                         indeterminate.



                                        



                                         2. The right ventricular chamber size a

nd systolic function are within



                                         normal limits. S' 11 cm/sec.



                                        



                                         3. LA size is at the upper limit of nor

mal indexed to BSA. RA size is



                                         mildly dilated.



                                        



                                         4. A trace of tricuspid regurgitation. 

Peak systolic pressure may be



                                         underestimated; partial TR signal. Whit

mated peak systolic PA pressure is



                                         at least 30-35 mmHg. The estimated RA p

ressure by IVC dynamics 5-10mmHg .



                                        



                                         Previous Study



                                         No prior studies available for comparis

on.



                                        



                                         Signature



                                        



                                         ---------------------------------------

-------------------------



                                         Electronically signed by DARRYN Franklin(Interpreting



                                         physician) on 2020 04:04 PM



                                         ---------------------------------------

-------------------------



                                        



                                         Findings



                                        



                                        Technical Quality: Technically adequate 

exam.



                                        



                                         Left Ventricle         The left ventric

le is chamber size (by vol index)



                                                                is moderately en

larged (female - LVED vol



                                                                -71-80ml/m2). LV

 endocardium is adequately



                                                                visualized with 

IV ultrasound enhancing agent. Mild



                                                                concentric LVH n

oted. The following segment(s)



                                                                appear akinetic:

 inferior wall, basal/mid



                                                                infereolateral. 

The other segments are hypokinetic.



                                                                Global LV systol

ic function is moderately reduced .



                                                                LVEF by Coy'

s method of disk assessment is



                                                                moderately reduc

ed (30-34%) . Diastolic function is



                                                                indeterminate.



                                        



                                         Left Atrium            LA size is at th

e upper limit of normal indexed to



                                                                BSA.



                                        



                                         Right Ventricle        The right ventri

cular chamber size and systolic



                                                                function are wit

hin normal limits. S' 11 cm/sec.



                                        



                                         Right Atrium           RA size is mildl

y dilated.



                                        



                                         Aortic Valve           Mild AoV cusp th

ickening.



                                                                Mild aortic regu

rgitation.



                                                                No evidence of a

ortic stenosis.



                                        



                                         Mitral Valve           Mild MV leaflet 

thickening.



                                                                Mild mitral mary

lar calcification.



                                                                Mild mitral regu

rgitation.



                                        



                                         Tricuspid Valve        TV structure is 

normal.



                                                                A trace of tricu

spid regurgitation. Peak systolic



                                                                pressure may be 

underestimated; partial TR signal.



                                                                Estimated peak s

ystolic PA pressure is at least



                                                                30-35 mmHg.



                                        



                                         Pulmonic Valve         Normal PV struct

ure and function by limited views



                                                                and Doppler.



                                        



                                         Aorta                  Aortic root size

 (SInus of Valsalva diameter) is



                                                                normal .



                                                                Proximal ascendi

ng aorta size is normal .



                                        



                                         Pericardium            A trivial perica

rdial effusion is present .



                                        



                                         IVC/SVC/PA/PV/Pleural  The estimated RA

 pressure by IVC dynamics 5-10mmHg



                                                                .



                                        



                                        Chambers/Structures



                                        



                                         Left Atrium



                                        



                                         LA Dimension: 3.8 cm                   

LA Area: 18.23 cm^2



                                         LA Volume: 70 ml



                                         LA Vol. Index: 34 ml/m^2



                                        



                                         Left Ventricle



                                        



                                         LVIDd: 5.42 cm



                                         LVIDs: 4.71 cm



                                         LV Septum Diastolic: 1.19 cm



                                         LV PW Diastolic: 1.22 cm               

      LV FS: 13.1 %



                                         LVEDV Coy's:154 ml



                                         LVESV Coy's:103 ml                 

      LVEDVI: 75 ml/m^2



                                         LVEF Coy's: 32 %                   

      LVESVI: 50 ml/m^2



                                        



                                         LVOT Diameter: 2.02 cm



                                        



                                         Right Atrium



                                        



                                                 RA Area: 18 cm^2



                                                 RA Vol. (Sngl Plane): 50 ml



                                        



                                         Right Ventricle



                                        



                                         RV Diast Dim.: 3.6 cm



                                                                       RV Systol

ic Pressure: 34.72 mmHg



                                                                       TAPSE: 1.

8 cm



                                        



                                        Aorta



                                        



                                         Ao Root S of Elizabeth.: 3 cm                

  Ascending Aorta: 3 cm



                                        



                                        Doppler/Quantitative Measurements



                                        



                                         Mitral Valve



                                        



                                         MV Peak E-Wave: 1.15 m/s              M

V Peak A-Wave: 1.34 m/s



                                                                               E

/A Ratio: 0.86



                                                                               P

eak Gradient: 5.26 mmHg



                                                                               D

eceleration Time: 137.6 msec



                                        



                                         MV Reggie. Peak:



                                        



                                         Tissue Doppler



                                        



                                         E' Lateral Velocity: 0.04 m/s         E

/E': 27.72



                                        



                                         Aortic Valve



                                        



                                         Peak Velocity: 1.47 m/s                

  Mean Velocity: 1.07 m/s



                                         Peak Gradient: 8.67 mmHg               

  Mean Gradient: 5.15 mmHg



                                         AV Area (continuity): 2.31 cm^2



                                         AV VTI: 31.3 cm



                                        



                                         AV DVI: 0.72



                                        



                                         LVOT



                                        



                                         Peak Velocity: 0.95 m/s             Pea

k Gradient: 3.63 mmHg



                                         Mean Velocity: 0.68 m/s             Brigid

n Gradient: 2.08 mmHg



                                         LVOT Diameter: 2.02 cm              LVO

T VTI: 22.6 cm



                                         LVOT Area: 3.2 cm^2                 LVO

T SV:72.39 ml



                                         LVOT CO: 6.66 l/min                 LVO

T CI: 3.26 l/min/m^2



                                        



                                         Tricuspid Valve



                                        



                                         Estimated RAP: 10 mmHg



                                         TR Velocity: 2.49 m/s



                                         TR Gradient: 24.72 mmHg



                                        



                                         Pulmonic Valve



                                        



                                                  Estimated PASP: 34.72 mmHg



                                        









                Performing Organization Address         City/State/Zipcode Phone

 Number

 

                [facility]                              

   



Blood gas, arterial (2020  4:46 AM CDT)





                Component       Value           Ref Range       Performed At

 

                pH, Arterial    7.41            7.35 - 7.45     [facility]

 

                pCO2, Arterial  33 (L)          35 - 45 mmHg    [facility]

 

                pO2, Arterial   92 (H)          80 - 90 mmHg    [facility]

 

                O2 Sat, Arterial 97.3 (H)        96.0 - 97.0 %   [facility] hospital

[facility]

 

                HCO3, Arterial  21              21 - 29 mmol/L  [facility]

 

                Base Excess, Arterial -3.3 (L)        -2.0 - 3.0 mmol/L [facility]

 

                Patient Temperature 36.4            C               [facility]

 

                FIO2            28.0            %               [facility]









                                        Specimen

 

                                        Blood, Arterial









                Performing Organization Address         City/State/Zipcode Phone

 Number

 

                [facility] [address] [place]

 [address] 

[phone]



                CENTER                                          



XR chest 1 view portable / bedside (2020  3:57 AM CDT)





                                        Specimen

 

                                        









                          Narrative                 Performed At

 

                                        FINAL REPORT



                                        [facility]



                                         



                                        



                                        PATIENT ID: [id number]



                                        



                                         



                                        



                                        RAD, CHEST, 1 VIEW, NON DEPT



                                        



                                         



                                        



                                        INDICATION: dyspnea



                                        



                                         



                                        



                                        COMPARISON: None available



                                        



                                         



                                        



                                        FINDINGS: Portable frontal view of the c

hest. 



                                        



                                         



                                        



                                         



                                        



                                        IMPRESSION: 



                                        



                                         



                                        



                                        Support Lines: None. 



                                        



                                        Lungs and pleura: Hazy left basilar airs

pace opacity may represent 



                                        



                                        small left pleural effusion and adjacent

 atelectasis however 



                                        



                                        superimposed infection should be exclude

d clinically. Prominence of 



                                        



                                        the pulmonary vasculature with coarse in

terstitial lung markings 



                                        



                                        favored represent interstitial pulmonary

 edema and pulmonary vascular 



                                        



                                        congestion. No pneumothorax. 



                                        



                                        Heart and mediastinum: Within normal grant

its. Atherosclerotic 



                                        



                                        calcifications of the thoracic aorta.



                                        



                                        Additional findings: Osseous structures 

are grossly unremarkable.



                                        



                                         



                                        



                                        Signed: Johan Davis MD



                                        



                                        Report Verified Date/Time:2020

 04:21:58



                                        



                                         



                                        



                          Electronically signed by: JOHAN DAVIS MD on 2020 



                                        04:21 AM



                                        



                                                    









                                        Procedure Note

 

                                        Interface, External Ris In - 2020 

 4:24 AM CDT



FINAL REPORT



                                        



                                        PATIENT ID:   [id number]



                                        



                                        RAD, CHEST, 1 VIEW, NON DEPT



                                        



                                        INDICATION: dyspnea



                                        



                                        COMPARISON: None available



                                        



                                        FINDINGS: Portable frontal view of the c

hest.



                                        



                                        



                                        IMPRESSION:



                                        



                                        Support Lines: None.



                                        Lungs and pleura: Hazy left basilar airs

pace opacity may represent



                                        small left pleural effusion and adjacent

 atelectasis however



                                        superimposed infection should be exclude

d clinically. Prominence of



                                        the pulmonary vasculature with coarse in

terstitial lung markings



                                        favored represent interstitial pulmonary

 edema and pulmonary vascular



                                        congestion. No pneumothorax.



                                        Heart and mediastinum: Within normal grant

its. Atherosclerotic



                                        calcifications of the thoracic aorta.



                                        Additional findings: Osseous structures 

are grossly unremarkable.



                                        



                                        Signed: Johan Davis MD



                                        Report Verified Date/Time:  2020 0

4:21:58



                                        



                                          Electronically signed by: JOHAN DAVIS MD on 2020 04:21 AM



                                        









                Performing Organization Address         City/[facility]/Zipcode Phone

 Number

 

                [facility]                                          



D-dimer (2020  3:26 AM CDT)





                Component       Value           Ref Range       Performed At

 

                D-Dimer, Quant  1.32 (H)        <0.50 MG/L FEU  [facility]









                                        Specimen

 

                                        Blood









                          Narrative                 Performed At

 

                          Intended Use: The D-Dimer Assay can be used [facility]



                          to aid in the diagnosis of Deep Vein 



                          Thrombosis (DVT) and Pulmonary Embolism 



                                        Disease (PED).



                                        



                          In patients with low pre-test probability, 



                          various studies concerning STA Liatest 



                          D-dimer test have reported that with a cutoff 



                          value of 0.50 MG/L FEU, the Negative 



                          Predictive Value (NPV) regarding the 



                          exclusion of thrombosis is within % 



                          range.                    









                Performing Organization Address         City/[facility]/Zipcode Phone

 Number

 

                [facility] MED[facility] [address] [place]

 [address] [phone]



                CENTER                                          



CBC (Hemogram only) (2020  3:26 AM CDT)





                Component       Value           Ref Range       Performed At

 

                WBC             9.0             3.5 - 10.5 K/L [facility]

 

                RBC             5.19            3.93 - 5.22 M/L [facility]

 

                Hemoglobin      14.6            11.2 - 15.7 GM/DL [facility]

 

                Hematocrit      45.3 (H)        34.1 - 44.9 %   [facility]

 

                MCV             87.3            79.4 - 94.8 fL  [facility] HE

[facility]

 

                MCH             28.1            25.6 - 32.2 pg  [facility]

 

                MCHC            32.2            32.2 - 35.5 GM/DL [facility]

 

                RDW             14.6 (H)        11.7 - 14.4 %   [facility]

 

                Platelets       324             150 - 450 K/CU MM [facility]

 

                MPV             11.1            9.4 - 12.3 fL   [facility]

 

                nRBC            0               0 - 0 /100 WBC  [facility]









                                        Specimen

 

                                        Blood









                Performing Organization Address         City/[facility]

 Number

 

                [address]

 [address] [phone]



                CENTER                                          



B-type Natriuretic Factor (BNP) (2020  3:26 AM CDT)





                Component       Value           Ref Range       Performed At

 

                BNP             866 (H)         0 - 100 pg/mL   [facility]









                                        Specimen

 

                                        Blood









                          Narrative                 Performed At

 

                           [facility]O L     [facility]









                Performing Organization Address         City/[facility]/Zipcode Phone

 Number

 

                [address]

 [address] [phone]



                CENTER                                          



Magnesium (2020  3:26 AM CDT)





                Component       Value           Ref Range       Performed At

 

                Magnesium       1.8             1.6 - 2.6 mg/dL [facility]









                                        Specimen

 

                                        Blood









                          Narrative                 Performed At

 

                           ID - PIGORDO L     [facility]









                Performing Organization Address         City/[facility]/Zipcode Phone

 Number

 

                [address]

 [address] [phone]



                CENTER                                          



Basic metabolic panel (2020  3:26 AM CDT)





                Component       Value           Ref Range       Performed At

 

                Sodium          133 (L)         136 - 145 meq/L [facility]

 

                Potassium       4.8             3.5 - 5.1 meq/L [facility]

 

                Chloride        102             98 - 107 meq/L  [facility]

 

                CO2             21 (L)          22 - 29 meq/L   [facility]

 

                BUN             61 (H)          7 - 21 mg/dL    [facility]

 

                Creatinine      2.54 (H)        0.57 - 1.25 mg/dL [facility]

 

                Glucose         571 (HH)        70 - 105 mg/dL  [facility]

 

                Calcium         9.1             8.4 - 10.2 mg/dL [facility] hospital

[facility]

 

                EGFR            18Comment: ESTIMATED GFR IS mL/min/1.73 sq m [facility]



                                NOT AS ACCURATE AS CREATININE                 ME

DICAL CENTER



                                CLEARANCE IN PREDICTING                 



                                GLOMERULAR FILTRATION RATE.                 



                                ESTIMATED GFR IS NOT                 



                                APPLICABLE FOR DIALYSIS                 



                                PATIENTS.                       









                                        Specimen

 

                                        Blood









                          Narrative                 Performed At

 

                           ID - PIAYA L     [facility] MED

ICAL CENTER









                Performing Organization Address         City/State/Zipcode Phone

 Number

 

                [facility] [address] [place]

 [address] [phone]



                CENTER                                          



after 2019



Insurance







           Payer      Benefit Plan / Group Subscriber ID Type       Phone      A

ddress

 

           MEDICARE   MEDICARE A B xxxxxxxxxxx Medicare              

 

           MEDICAID   [place] xxxxxxxxx  Medicaid              









           Guarantor Name Account Type Relation to Date of    Phone      Billing

 Address



                                 Patient    Birth                 

 

           Lulu Hdez Personal/Family Self       1945 [phone]



                                                       (Home)     [place], [place]



                                                                  [id number]







Advance Directives

For more information, please contact:[facility] [place] [address] [phone]





                Code Status     Date Activated  Date Inactivated Comments

 

                Full Code       2020  3:04 AM 2020  6:31 PM 









                    This code status was determined by: Patient             









                                                                

 

                Full Code       10/1/2018  8:14 PM 10/4/2018  6:19 PM 









                    This code status was determined by: Patient

## 2020-06-19 NOTE — XMS REPORT
Continuity of Care Document

                            Created on:2020



Patient:FRANSISCO HDEZ

Sex:Female

:1945

External Reference #:[id number]





Demographics







                          Address                   [address] [place] [address]

 

                          Home Phone                [phone]

 

                          Mobile Phone              [phone]

 

                          Email Address             [email or web address]

 

                          Preferred Language        English

 

                          Marital Status            Unknown

 

                          Yazidi Affiliation     Unknown

 

                          Race                      Unknown

 

                          Additional Race(s)        Unavailable



                                                    White

 

                          Ethnic Group              Unknown









Author







                          Organization              [facility]

t

 

                          Address                   [address]



                                                    [place] [address]

 

                          Phone                     [phone]









Support







                Name            Relationship    Address         Phone

 

                Maurice         Child           [phone]

[phone]



                                                [place] [address] 

 

                Kiran             Child           UNKNOWN         [phone]



                                                [place] [address] 

 

                Maurice         N[place]      Unavailable     [phone]









Care Team Providers







                    Name                Role                Phone

 

                    Umesh LANCASTER,  Richie      Primary Care Physician [phone]

 

                    Namrata LANCASTER     Attending Clinician [phone]

 

                    Rach Valerio MD      Attending Clinician [phone]

 

                    Panchito Cox MD Attending Clinician [phone]

 

                    NAMRATA        Attending Clinician Unavailable

 

                    JANET Bacon       Attending Clinician [phone]

 

                    Doctor Unassigned,  Name Attending Clinician Unavailable

 

                    MARICEL ZAVALA            Attending Clinician Unavailable

 

                    PANCHITO COX  Admitting Clinician Unavailable

 

                    MARICEL ZAVALA            Admitting Clinician Unavailable









Payers







           Payer Name Policy     Policy Number Effective  Expiration Source



                      Type                  Date       Date       

 

           [facility]ICAREMEDICARE A            xxxxxxxxxxx                       [facility]

[facility]



           [facility]

 

           MEDICAIDMEDICAID OF            xxxxxxxxx                        [facility]

t



           [id number]                                             [facility]







Problems







       Condition Condition Condition Status Onset  Resolution Last   Treating Co

mments 

Source



       Name   Details Category        Date   Date   Treatment Clinician        



                                                 Date                 

 

       Dyspnea Dyspnea Disease Active                              [facility]



                                   5-05                               [facility]



                                   [facility]                             [facility]



                                   00                                 Center

 

       Stage 2 Stage 2 Disease Active 2018                             [facility]



       [facility]



       [facility]               00[facility]00:                             Medical



       disease disease               00                                 Center

 

       UTI    UTI    Disease Active 2018                             [facility]



       (urinary (urinary               0                               [facility] 

-



       tract  tract                00:00:                             Medical



       infection) infection)               00                                 Ce

nter

 

       Hypertensi Hypertensi Disease Active 2018                             [facility]

[facility]



       on     on                   0                               [facility]



                                   00[facility]00:                             Medical



                                   00                                 Center

 

       Diabetes Diabetes Disease Active 2018                             [facility]

t



       mellitus, mellitus,               0-                               [facility]

s -



       type 2 type 2               00:00:                             Medical



                                   00                                 Center

 

       Essential Essential Disease Active 2018                             [facility]



       hypertensi hypertensi               0                               [facility]

[facility] -



       on     on                   00:00:                             Medical



                                   00                                 Center

 

       CAD    CAD    Disease Active 2018                             [facility]



       (coronary (coronary               0                               [facility]

s -



       artery artery               00:00:                             Medical



       disease) disease)               00                                 Center

 

       Obesity Obesity Disease Active 2018                             [facility]



                                   0-                               [facility]



                                   00[facility]                             Medical



                                   00                                 Center

 

       Vertigo Vertigo Disease Active 2018                             [facility]



                                   0-                               [facility]



                                   00:00:                             Medical



                                   00                                 Center







Allergies, Adverse Reactions, Alerts







       Allergy Allergy Status Severity Reaction(s) Onset  Inactive Treating Comm

ents 

Source



       Name   Type                        Date   Date   Clinician        

 

       Statins- Propensi Active        Other (See                Unknown. 

[facility]



       Hmg-Coa ty to                Comments) 1-30                 Pt states Mar

es -



       Reductas adverse                      00:00:               she is Medical



       e      reaction                      00                   allergic Center



       Inhibito s                                                to     



       rs                                                      statins 



                                                               and was 



                                                               told by 



                                                               doctor 



                                                               not to 



                                                               take   



                                                               them.  



                                                               States 



                                                               does not 



                                                               know the 



                                                               reactions 



                                                               .      







Social History







           Social Habit Start Date Stop Date  Quantity   Comments   Source

 

           History SDOH Alcohol                                             [facility]



           Std Drinks                                             [facility]

 

           History SDOH Alcohol                                             [facility]



           Binge                                                  [facility]

 

           Sex Assigned At                                             [facility]



           Birth                                                  [facility]

 

           History SDOH Alcohol 2020 1                     [facility]



           Frequency  00:00:00   00:00:00                         [facility]









                Smoking Status  Start Date      Stop Date       Source

 

                Never smoker                                    [facility] M

edical Center







Medications







       Ordered Filled Start  Stop   Current Ordering Indication Dosage Frequency

 Signature

                    Comments            Components          Source



     Medication Medication Date Date Medication? Clinician                (SIG) 

          



     Name Name                                                   

 

     insulin              type 2 35U  Q.5D Inject 35           [facility]

[facility]



     detemir                 diabetes           Units           [facility] 

-



     U-100      13:34: 00:00           mellitus           subcutaneo           M

edical



     (LEVEMIR)      34   :00                           ly 2           Center



     100 unit/mL                                         (two)           



     injection                                         times           



                                                  daily 35           



                                                  units in           



                                                  AM 35           



                                                  units at           



                                                  night .           

 

     insulin            Yes       type 2 40U  Q.5D Inject 40           [facility]



     detemir      5-05                diabetes           Units           [facility]



     U-100      00:00:                mellitus           subcutaneo           Me

dical



     (LEVEMIR)      00                                 usly 2           Center



     100 unit/mL                                         (two)           



     injection                                         times           



                                                  daily 35           



                                                  units in           



                                                  AM 35           



                                                  units at           



                                                  night .           

 

     amLODIPine      2020- No             2.5mg QD   Take 2.5           C

[facility]



     (NORVASC)      4-14 05-14                          mg by           [facility]



     2.5 MG      00:00: 23:59                          mouth           Medical



     tablet      00   :00                           daily.           [facility]

 

     carvedilol      2018      Yes            12.5mg      Take 12.5           

[facility]



     (COREG)      0-01                               mg by           [facility]



     12.5 MG      23:28:                               mouth 2           Medical



     tablet      44                                 (two)           Center



                                                  times           



                                                  daily with           



                                                  breakfast           



                                                  and            



                                                  dinner.           

 

     furosemide      2018      Yes            40mg QD   Take 40 mg           C

[facility]



     (LASIX) 40      0-01                               by mouth           [facility]

 -



     MG tablet      21:09:                               daily.           Medica

l



               10                                                [facility]

 

     insulin      2018      Yes                      Inject           [facility]



     lispro      0-01                               subcutaneo           [facility] -



     (HUMALOG)      21:08:                               usly 3           Medica

l



     100 unit/mL      07                                 (three)           [facility]

r



     injection                                         times           



                                                  daily           



                                                  before           



                                                  meals.           

 

     apixaban      2018      Yes            2.5mg Q.5D Take 2.5           [facility]



     (ELIQUIS)      0-01                               mg by           [facility]



     2.5 mg Tab      21:08:                               mouth 2           Medi

padmini



     tablet      07                                 (two)           Center



                                                  times           



                                                  daily.           

 

     allopurinol      2018      Yes            300mg QD   Take 300           C

[facility]



     (ZYLOPRIM)      0-01                               mg by           [facility]



     300 MG      21:08:                               mouth           Medical



     tablet      07                                 daily.           [facility]







Vital Signs







             Vital Name   Observation Time Observation Value Comments     Source

 

             Systolic blood 2020 12:30:00 127 mm[Hg]                [facility]

 [facility]

 

             Diastolic blood 2020 12:30:00 58 mm[Hg]                 [facility]

t [facility]

 

             Heart rate   2020 12:30:00 73 /min                   [facility]

[facility]

 

             Body temperature 2020 12:30:00 36.39 Delisa                 [facility]

 

             Respiratory rate 2020 12:30:00 18 /min                   [facility]

 

             Oxygen saturation in 2020 12:30:00 96 /min                   

[facility] -



             Arterial blood by                                        Medical Ce

nter



             Pulse oximetry                                        

 

             Body height  2020 07:20:00 160 cm                    [facility]

 

             Body weight Measured 2020 07:20:00 103.783 kg                

[facility]

 

             BMI          2020 07:20:00 40.53 kg/m2               [facility]







Procedures







                Procedure       Date / Time     Performing Clinician Source



                                Performed                       

 

                RHYTHM STRIP - SCAN 2020 12:50:59 Provider, Default [facility]

 

                REPORT OF PROCEDURE - 2020 10:40:38 Provider, Default [facility]



                ENDOSCOPY [facility]

 

                RHYTHM STRIP - SCAN 2020 10:40:36 Provider, Default [facility]

 

                ECHOCARDIOGRAM REPORT - 2020 21:21:43 Provider, Default [facility]

 

                POCT-GLUCOSE METER 2020 12:26:00 Teodoro, [facility]

 

                2D ECHO W/ [facility]OPPLER [facility]31[facility]



                (CW/PW/COLOR)                   [facility]

 

                POCT-GLUCOSE METER 2020 07:38:00 Teodoro [facility]

 

                BLOOD GAS, ARTERIAL 2020 04:46:00 Taylor [facility]

 

                XR CHEST 1 VIEW 2020 03:57:00 Taylor [facility]/BEDSIDE                 [facility]

 

                BASIC METABOLIC PANEL (7) 2020 03:26:00 Jarrod Vazquez

[facility]

 

                MAGNESIUM       2020 03:26:00 Taylor [facility]

 

                CBC (HEMOGRAM ONLY) 2020 03:26:00 Taylor [facility]

 

                D[facility]IM[facility]         2020 [facility]

 

                B-TYPE NATRIURETIC FACTOR 2020 03:26:00 [facility]



                ([facility])                           [facility]







Encounters







        Start   End     Encounter Admission Attending Care    Care    Encounter 

Source



        Date/Time Date/Time Type    Type    Clinicians Facility Department ID   

   

 

        2019 Emergency         ASHLEY Han    [id number]

[id number] 



        15:49:49 17:42:00                 Jaspreet Taylor [id number]         



                                                [place] [id number]         



                                                [place]  [phone]         



                                                        084             

 

        2019 Orders          Doctor  JOSE MANUEL    [id number]

[id number] 



        00:00:00 00:00:00 Only            Unassigned, SHLOMO   [id number]       

  



                                        [facility] [id number]         



                                                        [phone]         



                                                        009             







Results







           Test Description Test Time  Test Comments Results    Result     [facility]

e



                                                       Comments   

 

           2D Echo                 Ejection FractionSLEH            [facility]



           W/Doppler(CW/PW/C 5                     ECHO [facility]            - [facility])      16:04:04              [facility]



                                            CPACSInterface,            



                                            External Ris In -            



                                            2020  4:04 PM            



                                            CDTTransthoracic            



                                            Echocardiography            



                                            Report (TTE)            



                                            Demographics  Patient            



                                            Name   FRANSISCO HDEZ            



                                              Date of Study            



                                            2020  MRN            



                                                 [id number]            



                                            Gender                



                                            Female  Visit Number            



                                             [id number]            



                                            Race                  



                                              Accession            



                                                [id number]            



                                            Room Number            



                                            1409 Number  Date of            



                                            Birth  1945            



                                              Referring Physician            



                                            Viki Ott            



                                             Age            75            



                                            year(s)               



                                            Sonographer            



                                            Curry Liang [facility]            



                                            Analyst        Claire Guidry,               



                                            Interpreting            



                                            Pamela Christy MD            



                                                     RDCS            



                                                Physician            



                                            Procedure Type of            



                                            Study     TTE            



                                            procedure:2DECHO W            



                                            DOPPLER(CW/PW/COLOR)            



                                            (Routine)             



                                            Indications:Shortness            



                                            of breath.Clinical            



                                            HistoryHGB 14.6HCT            



                                            45.3 %COPD, CKD, DM,            



                                            HF, CVAContrast            



                                            Medium:               



                                            Definity.Height: 63            



                                            inches Weight: 103.42            



                                            kg (228 lbs) BSA:            



                                            2.04 m^2 BMI:            



                                            40.39kg/m^2HR: 92 bpm            



                                            BP: 144/69 mmHg            



                                            Summary 1. The left            



                                            ventricle is chamber            



                                            size (by vol index)            



                                            is moderately            



                                            enlarged (female -            



                                            LVED vol              



                                            -71-80ml/m2). LV            



                                            endocardium is            



                                            adequately visualized            



                                            with IV ultrasound            



                                            enhancing agent. Mild            



                                            concentric LVH noted.            



                                            The following            



                                            segment(s) appear            



                                            akinetic: inferior            



                                            wall, basal/mid            



                                            infereolateral. The            



                                            other segments are            



                                            hypokinetic. Global            



                                            LV systolic function            



                                            is moderately reduced            



                                            . LVEF by Coy's            



                                            method of disk            



                                            assessment is            



                                            moderately reduced            



                                            (30-34%) . Diastolic            



                                            function is            



                                            indeterminate.  2.            



                                            The right ventricular            



                                            chamber size and            



                                            systolic function are            



                                            within normal limits.            



                                            S' 11 cm/sec.  3. LA            



                                            size is at the upper            



                                            limit of normal            



                                            indexed to BSA. RA            



                                            size is mildly            



                                            dilated.  4. A trace            



                                            of tricuspid            



                                            regurgitation. Peak            



                                            systolic pressure may            



                                            be underestimated;            



                                            partial TR signal.            



                                            Estimated peak            



                                            systolic PA pressure            



                                            is at least 30-35            



                                            mmHg. The estimated            



                                            RA pressure by IVC            



                                            dynamics 5-10mmHg .            



                                            Previous Study No            



                                            prior studies            



                                            available for            



                                            comparison.            



                                            Signature             



                                            ---------------------            



                                            ---------------------            



                                            ---------------------            



                                            - Electronically            



                                            signed by Pamela Crhisty MD(Interpreting            



                                            physician) on            



                                            2020 04:04 PM            



                                            ---------------------            



                                            ---------------------            



                                            ---------------------            



                                            -  Findings Technical            



                                            Quality: Technically            



                                            adequate exam. Left            



                                            Ventricle         The            



                                            left ventricle is            



                                            chamber size (by vol            



                                            index)                



                                                    is moderately            



                                            enlarged (female -            



                                            LVED vol              



                                                                  



                                            -71-80ml/m2). LV            



                                            endocardium is            



                                            adequately            



                                                                  



                                            visualized with IV            



                                            ultrasound enhancing            



                                            agent. Mild            



                                                                  



                                            concentric LVH noted.            



                                            The following            



                                            segment(s)            



                                                        appear            



                                            akinetic: inferior            



                                            wall, basal/mid            



                                                                  



                                            infereolateral. The            



                                            other segments are            



                                            hypokinetic.            



                                                          Global            



                                            LV systolic function            



                                            is moderately reduced            



                                            .                     



                                               LVEF by Coy's            



                                            method of disk            



                                            assessment is            



                                                                  



                                            moderately reduced            



                                            (30-34%) . Diastolic            



                                            function is            



                                                                  



                                            indeterminate.  Left            



                                            Atrium            LA            



                                            size is at the upper            



                                            limit of normal            



                                            indexed to            



                                                        BSA.            



                                            Right Ventricle            



                                             The right            



                                            ventricular chamber            



                                            size and systolic            



                                                                  



                                            function are within            



                                            normal limits. S' 11            



                                            cm/sec.  Right Atrium            



                                                      RA size is            



                                            mildly dilated.            



                                            Aortic Valve            



                                             Mild AoV cusp            



                                            thickening.            



                                                         Mild            



                                            aortic regurgitation.            



                                                                  



                                             No evidence of            



                                            aortic stenosis.            



                                            Mitral Valve            



                                             Mild MV leaflet            



                                            thickening.            



                                                         Mild            



                                            mitral annular            



                                            calcification.            



                                                            Mild            



                                            mitral regurgitation.            



                                             Tricuspid Valve            



                                              TV structure is            



                                            normal.               



                                                     A trace of            



                                            tricuspid             



                                            regurgitation. Peak            



                                            systolic              



                                                      pressure            



                                            may be                



                                            underestimated;            



                                            partial TR signal.            



                                                                  



                                            Estimated peak            



                                            systolic PA pressure            



                                            is at least            



                                                         30-35            



                                            mmHg.  Pulmonic Valve            



                                                    Normal PV            



                                            structure and            



                                            function by limited            



                                            views                 



                                                   and Doppler.            



                                            Aorta                 



                                             Aortic root size            



                                            (SInus of Valsalva            



                                            diameter) is            



                                                          normal            



                                            .                     



                                               Proximal ascending            



                                            aorta size is normal            



                                            .  Pericardium            



                                                A trivial            



                                            pericardial effusion            



                                            is present .            



                                            IVC/SVC/PA/PV/Pleural            



                                             The estimated RA            



                                            pressure by IVC            



                                            dynamics 5-10mmHg            



                                                               .            



                                            Chambers/Structures            



                                            Left Atrium  LA            



                                            Dimension: 3.8 cm            



                                                          LA            



                                            Area: 18.23 cm^2 LA            



                                            Volume: 70 ml LA Vol.            



                                            Index: 34 ml/m^2            



                                            Left Ventricle            



                                            LVIDd: 5.42 cm LVIDs:            



                                            4.71 cm LV Septum            



                                            Diastolic: 1.19 cm LV            



                                            PW Diastolic: 1.22 cm            



                                                                  



                                            LV FS: 13.1 % LVEDV            



                                            Coy's:154 ml            



                                            LVESV Coy's:103            



                                            ml                    



                                               LVEDVI: 75 ml/m^2            



                                            LVEF Coy's: 32 %            



                                                                  



                                             LVESVI: 50 ml/m^2            



                                            LVOT Diameter: 2.02            



                                            cm  Right Atrium            



                                                RA Area: 18 cm^2            



                                                   RA Vol. (Sngl            



                                            Plane): 50 ml  Right            



                                            Ventricle  RV Diast            



                                            Dim.: 3.6 cm            



                                                                  



                                            RV Systolic Pressure:            



                                            34.72 mmHg            



                                                                  



                                            TAPSE: 1.8 cm Aorta            



                                            Ao Root S of Elizabeth.: 3            



                                            cm                    



                                            Ascending Aorta: 3 cm            



                                            Doppler/Quantitative            



                                            Measurements Mitral            



                                            Valve  MV Peak            



                                            E-Wave: 1.15 m/s            



                                                    MV Peak            



                                            A-Wave: 1.34 m/s            



                                                                  



                                                       E/A Ratio:            



                                            0.86                  



                                                                  



                                            Peak Gradient: 5.26            



                                            mmHg                  



                                                                  



                                            Deceleration Time:            



                                            137.6 msec  MV Reggie.            



                                            Peak:  Tissue Doppler            



                                             E' Lateral Velocity:            



                                            0.04 m/s              



                                            E/E': 27.72  Aortic            



                                            Valve  Peak Velocity:            



                                            1.47 m/s              



                                                Mean Velocity:            



                                            1.07 m/s Peak            



                                            Gradient: 8.67 mmHg            



                                                          Mean            



                                            Gradient: 5.15 mmHg            



                                            AV Area (continuity):            



                                            2.31 cm^2 AV VTI:            



                                            31.3 cm  AV DVI: 0.72            



                                             LVOT  Peak Velocity:            



                                            0.95 m/s              



                                            Peak Gradient: 3.63            



                                            mmHg Mean Velocity:            



                                            0.68 m/s              



                                            Mean Gradient: 2.08            



                                            mmHg LVOT Diameter:            



                                            2.02 cm               



                                            LVOT VTI: 22.6 cm            



                                            LVOT Area: 3.2 cm^2            



                                                          LVOT            



                                            SV:72.39 ml LVOT CO:            



                                            6.66 l/min            



                                                 LVOT CI: 3.26            



                                            l/min/m^2  Tricuspid            



                                            Valve  Estimated RAP:            



                                            10 mmHg TR Velocity:            



                                            2.49 m/s TR Gradient:            



                                            24.72 mmHg  Pulmonic            



                                            Valve                 



                                            Estimated PASP: 34.72            



                                            mmHg                  









                    POC-Glucose meter   2020 12:37:00 









                      Test Item  Value      Reference Range Interpretation Comme

nts









             POC-Glucose Meter (test code = 374 mg/dL           H         

   : Notified RN/MD: TESTED AT 

[facility]



             1538)                                               [address] [place] [address], [address]:



                                                                 /Techni

susan ID = [id number] for



                                                                 MARTINEZ CAMARENANTEL

YIMI

 

             Lab Interpretation (test code = Abnormal                           

    



             81475-4)                                            



[id number]37[facility]00





             Test Item    Value        Reference Range Interpretation Comments

 

             POC-GLUCOSE METER 374 mg/dL           H            : Notified

 RN/MD: TESTED



             (JOSE M) (test code                                        AT [facility]

 [address] [place] [address]



             = 1538)                                             [facility], [address]

30:



                                                                 /Techni

susan ID =



                                                                 [id number] for MARTINEZ SHAHMARY



POCT-GLUCOSE [id number] 07:50:00





             Test Item    Value        Reference Range Interpretation Comments

 

             POC-GLUCOSE METER 459 mg/dL           HH           : Notified

 RN/MD:



             (JOSE M) (test code =                                        TESTED

 AT [place] [address]



             1538)                                               [facility],



                                                                 [address]:



                                                                 /Techni

susan ID



                                                                 = [id number] for Cristina Mason



[id number] 05:23:00





             Test Item    Value        Reference Range Interpretation Comments

 

             D-Dimer, Quant (test code 1.32         <0.50 MG/L FEU H            



             = 32877-1)                                          

 

             BEBO (test code = BEBO) Intended Use: The                           



                          D-Dimer Assay can be                           



                          used to aid in the                           



                          diagnosis of Deep                           



                          Vein Thrombosis                           



                          (DVT) and Pulmonary                           



                          Embolism Disease                           



                          (PED).In patients                           



                          with low pre-test                           



                          probability, various                           



                          studies concerning                           



                          STA Liatest D-dimer                           



                          test have reported                           



                          that with a cutoff                           



                          value of 0.50 MG/L                           



                          FEU, the Negative                           



                          Predictive Value                           



                          (NPV) regarding the                           



                          exclusion of                           



                          thrombosis is within                           



                          % range.                           

 

             Lab Interpretation (test Abnormal                               



             code = 36752-5)                                        



[id number] 05:23[facility]00





             Test Item    Value        Reference Range Interpretation Comments

 

             D-DIMER QUANTITATIVE (BEAKER) 1.32 MG/L FEU <0.50        H         

   



             (test code = 671)                                        



Intended Use: The D-Dimer Assay can be used to aid in the diagnosis of Deep Vein
 Thrombosis (DVT) and Pulmonary Embolism Disease (PED).In patients with low pre-
test probability, various studies concerning STA Liatest D-dimer test have 
reported that with a cutoff value of 0.50 MG/L FEU, the Negative Predictive 
Value (NPV) regarding the exclusion of thrombosis is within % range.Basic 
metabolic [id number] 05:22:00





             Test Item    Value        Reference Range Interpretation Comments

 

             Sodium (test code = 133 meq/L    136-145      L            



             2951-2)                                             

 

             Potassium (test code = 4.8 meq/L    3.5-5.1                   



             2823-3)                                             

 

             Chloride (test code = 102 meq/L                        



             2075-0)                                             

 

             CO2 (test code = 21 meq/L     22-29        L            



             2028-9)                                             

 

             BUN (test code = 61 mg/dL     7-21         H            



             3094-0)                                             

 

             Creatinine (test code 2.54 mg/dL   0.57-1.25    H            



             = 2160-0)                                           

 

             Glucose (test code = 571 mg/dL           HH           



             2345-7)                                             

 

             Calcium (test code = 9.1 mg/dL    8.4-10.2                  



             72429-9)                                            

 

             EGFR (test code = 18           mL/min/1.73 sq m              ESTIMA

JOAQUIN GFR IS



             33914-3)                                            NOT AS ACCURATE



                                                                 AS CREATININE



                                                                 CLEARANCE IN



                                                                 PREDICTING



                                                                 GLOMERULAR



                                                                 FILTRATION RATE

.



                                                                 ESTIMATED GFR I

S



                                                                 NOT APPLICABLE



                                                                 FOR DIALYSIS



                                                                 PATIENTS.

 

             BEBO (test code = BEBO)  ID -                           



                          PIAYA L                                

 

             Lab Interpretation Abnormal                               



             (test code = 76334-3)                                        



[id number]22:00





             Test Item    Value        Reference Range Interpretation Comments

 

             SODIUM (BEAKER) 133 meq/L    136-145      L            



             (test code = 381)                                        

 

             POTASSIUM (BEAKER) 4.8 meq/L    3.5-5.1                   



             (test code = 379)                                        

 

             CHLORIDE (BEAKER) 102 meq/L                        



             (test code = 382)                                        

 

             CO2 (BEAKER) (test 21 meq/L     22-29        L            



             code = 355)                                         

 

             BLOOD UREA NITROGEN 61 mg/dL     7-21         H            



             (BEAKER) (test code                                        



             = 354)                                              

 

             CREATININE (BEAKER) 2.54 mg/dL   0.57-1.25    H            



             (test code = 358)                                        

 

             GLUCOSE RANDOM 571 mg/dL           HH           



             (BEAKER) (test code                                        



             = 652)                                              

 

             CALCIUM (BEAKER) 9.1 mg/dL    8.4-10.2                  



             (test code = 697)                                        

 

             EGFR (BEAKER) (test 18 mL/min/1.73                           ESTIMA

JOAQUIN GFR IS



             code = 1092) sq m                                   NOT AS ACCURATE

 AS



                                                                 CREATININE



                                                                 CLEARANCE IN



                                                                 PREDICTING



                                                                 GLOMERULAR



                                                                 FILTRATION RATE

.



                                                                 ESTIMATED GFR I

S



                                                                 NOT APPLICABLE 

FOR



                                                                 DIALYSIS PATIEN

TS.



 ID Clifton BARRAGAN [id number] 05:10:00





             Test Item    Value        Reference Range Interpretation Comments

 

             Magnesium (test code = 1.8 mg/dL    1.6-2.6                   



             15809-8)                                            

 

             BEBO (test code = BEBO)  ID Clifton BARRAGAN                          

 



                          L                                      

 

             Lab Interpretation (test Normal                                 



             code = 67503-6)                                        



[facility]





             Test Item    Value        Reference Range Interpretation Comments

 

             MAGNESIUM (BEAKER) (test code = 1.8 mg/dL    1.6-2.6               

    



             627)                                                



 ID Clifton BARRAGAN LBlood gas, [id number] 05:08:00





             Test Item    Value        Reference Range Interpretation Comments

 

             pH, Arterial (test code = 2744-1) 7.41         7.35-7.45           

      

 

             pCO2, Arterial (test code = 33           35- 45 mmHg  L            



             2019)                                             

 

             pO2, Arterial (test code = 92           80- 90 mmHg  H            



             2703-7)                                             

 

             O2 Sat, Arterial (test code = 97.3 %       96-97        H          

  



             2708-6)                                             

 

             HCO3, Arterial (test code = 21 mmol/L    21-29                     



             -4)                                             

 

             Base Excess, Arterial (test code -3.3 mmol/L  -2-3         L       

     



             = 1925-7)                                           

 

             Patient Temperature (test code = 36.4 C                            

     



             8310-5)                                             

 

             FIO2 (test code = 1819) 28 %                                   

 

             Lab Interpretation (test code = Abnormal                           

    



             84285-3)                                            



[facility] [id number] 05:08:00





             Test Item    Value        Reference Range Interpretation Comments

 

             PH ARTERIAL (BEAKER) (test code = 7.41         7.35-7.45           

      



             383)                                                

 

             PCO2 ARTERIAL (BEAKER) (test code 33 mmHg      35-45        L      

      



             = 384)                                              

 

             PO2 ARTERIAL (BEAKER) (test code 92 mmHg      80-90        H       

     



             = 385)                                              

 

             O2 SATURATION ARTERIAL (BEAKER) 97.3 %       96.0-97.0    H        

    



             (test code = 386)                                        

 

             HCO3 ARTERIAL (BEAKER) (test code 21 mmol/L    21-29               

      



             = 388)                                              

 

             BASE EXCESS ARTERIAL (BEAKER) -3.3 mmol/L  -2.0-3.0     L          

  



             (test code = 387)                                        

 

             PATIENT TEMPERATURE (BEAKER) 36.4 C                                

 



             (test code = 1818)                                        

 

             FIO2 (BEAKER) (test code = 1819) 28.0 %                            

     



B-type Natriuretic Factor (BNP)2020 05:03:00





             Test Item    Value        Reference Range Interpretation Comments

 

             BNP (test code = 08111-4) 866 pg/mL    0-100        H            

 

             BEBO (test code = BEBO)  ID Clifton BARRAGAN                          

 



                          L                                      

 

             Lab Interpretation (test Abnormal                               



             code = 98940-1)                                        



[facility] 05:03:00





             Test Item    Value        Reference Range Interpretation Comments

 

             B-TYPE NATRIURETIC PEPTIDE (BEAKER) 866 pg/mL    0-100        H    

        



             (test code = 700)                                        



 ID Clifton LAUREL LCBC (Hemogram only)2020 04:34:00





             Test Item    Value        Reference Range Interpretation Comments

 

             WBC (test code = 6690-2) 9.0          3.5- 10.5 K/L              

 

             RBC (test code = 789-8) 5.19         3.93- 5.22 M/L              

 

             MCHC (test code = 786-4) 32.2         32.2- 35.5 GM/DL             

 

 

             Hematocrit (test code = 4544-3) 45.3 %       34.1-44.9    H        

    

 

             MCV (test code = 787-2) 87.3 fL      79.4-94.8                 

 

             MCH (test code = 785-6) 28.1 pg      25.6-32.2                 

 

             RDW (test code = 788-0) 14.6 %       11.7-14.4    H            

 

             Platelets (test code = 777-3) 324          150- 450 K/CU MM        

      

 

             MPV (test code = 12128-9) 11.1 fL      9.4-12.3                  

 

             nRBC (test code = 413) 0            0- 0 /100 WBC              

 

             Lab Interpretation (test code = Abnormal                           

    



             99270-1)                                            



[facility] (HEMOGRAM ONLY)2020 04:34:00





             Test Item    Value        Reference Range Interpretation Comments

 

             WHITE BLOOD CELL COUNT (BEAKER) 9.0 K/ L     3.5-10.5              

    



             (test code = 775)                                        

 

             RED BLOOD CELL COUNT (BEAKER) 5.19 M/ L    3.93-5.22               

  



             (test code = 761)                                        

 

             HEMOGLOBIN (BEAKER) (test code = 14.6 GM/DL   11.2-15.7            

     



             410)                                                

 

             HEMATOCRIT (BEAKER) (test code = 45.3 %       34.1-44.9    H       

     



             411)                                                

 

             MEAN CORPUSCULAR VOLUME (BEAKER) 87.3 fL      79.4-94.8            

     



             (test code = 753)                                        

 

             MEAN CORPUSCULAR HEMOGLOBIN 28.1 pg      25.6-32.2                 



             (BEAKER) (test code = 751)                                        

 

             MEAN CORPUSCULAR HEMOGLOBIN CONC 32.2 GM/DL   32.2-35.5            

     



             (BEAKER) (test code = 752)                                        

 

             RED CELL DISTRIBUTION WIDTH 14.6 %       11.7-14.4    H            



             (BEAKER) (test code = 412)                                        

 

             PLATELET COUNT (BEAKER) (test 324 K/CU MM  150-450                 

  



             code = 756)                                         

 

             MEAN PLATELET VOLUME (BEAKER) 11.1 fL      9.4-12.3                

  



             (test code = 754)                                        

 

             NUCLEATED RED BLOOD CELLS 0 /100 WBC   0-0                       



             (BEAKER) (test code = 413)                                        



RAD, CHEST, 1 VIEW, NON [id number] 04:21:00Reason for exam:-
&gt;dyspneaShould this be performed at the bedside?-&gt;YesFINAL REPORT PATIENT 
ID:   [id number] RAD, CHEST, 1 VIEW, NON DEPT INDICATION: dyspnea COMPARISON: None
 available FINDINGS: Portable frontal view of the chest.   IMPRESSION:  Support 
Lines: None. Lungs and pleura: Hazy left basilar airspace opacity may represent 
small left pleural effusion and adjacent atelectasis however superimposed 
infection should be excluded clinically. Prominence of the pulmonaryvasculature 
with coarse interstitial lung markings favored represent interstitial pulmonary 
edema and pulmonary vascular congestion. No pneumothorax. Heart and mediastinum:
 Within normal limits. Atherosclerotic calcifications of the thoracic 
aorta.Additional findings: Osseous structures are grossly unremarkable. Signed: 
Johan Davis Verified Date/Time:  2020 04:21:58   
Electronically signed by: JOHAN DAVIS MD on 2020 04:21 AMXR 
chest 1 view portable / [id number] 04:21:00Interface, External Ris In - 
2020  4:24 AM CDTFINAL REPORT PATIENT ID:   [id number] RAD, CHEST, 1 VIEW, 
NON DEPT INDICATION: dyspnea COMPARISON: None available FINDINGS: Portable 
frontal view of the chest.   IMPRESSION:  Support Lines: None. Lungs and pleura:
 Hazy left basilar airspace opacity mayrepresent small left pleural effusion and
 adjacent atelectasis however superimposed infection shouldbe excluded 
clinically. Prominence of the pulmonary vasculature with coarse interstitial 
lung markings favored represent interstitial pulmonary edema and pulmonary 
vascular congestion. No pneumothorax.Heart and mediastinum: Within normal 
limits. Atherosclerotic calcifications of the thoracic aorta.Additional 
findings: Osseous structures are grossly unremarkable. Signed: Johan Davis Verified Date/Time:  2020 04:21:58   Electronically 
signed by: JOHAN DAVIS MD on 2020 04:21 [id number] 12:28:00





             Test Item    Value        Reference Range Interpretation Comments

 

             POC-GLUCOSE METER 138 mg/dL           H            TESTED AT 

[place] [address]



             ([facility]) (test code =                                        [address] [place] [address])                                               [address]



POCT-GLUCOSE METER2018-10-04 08:44:00





             Test Item    Value        Reference Range Interpretation Comments

 

             POC-GLUCOSE METER 112 mg/dL           H            TESTED AT 

[place] [address]



             ([facility]) (test code =                                        [address] [place] [address])                                               [address]



POCT-GLUCOSE METER2018-10-04 08:39:00





             Test Item    Value        Reference Range Interpretation Comments

 

             POC-GLUCOSE METER 69 mg/dL            L            Notified RICHAR ZIMMERMAN MD/TESTED AT



             ([facility]) (test code =                                        [place] [address])                                               [facility] [address]

0



BASIC METABOLIC PANEL2018-10-04 06:45:00





             Test Item    Value        Reference Range Interpretation Comments

 

             SODIUM (BEAKER) 142 meq/L    136-145                   



             (test code = 381)                                        

 

             POTASSIUM (BEAKER) 4.3 meq/L    3.5-5.1                   



             (test code = 379)                                        

 

             CHLORIDE (BEAKER) 111 meq/L           H            



             (test code = 382)                                        

 

             CO2 (BEAKER) (test 26 meq/L     22-29                     



             code = 355)                                         

 

             BLOOD UREA NITROGEN 54 mg/dL     7-21         H            



             (BEAKER) (test code                                        



             = 354)                                              

 

             CREATININE (BEAKER) 2.01 mg/dL   0.57-1.25    H            



             (test code = 358)                                        

 

             GLUCOSE RANDOM 71 mg/dL                         



             (BEAKER) (test code                                        



             = 652)                                              

 

             CALCIUM (BEAKER) 9.1 mg/dL    8.4-10.2                  



             (test code = 697)                                        

 

             EGFR (BEAKER) (test 24 mL/min/1.73                           ESTIMA

JOAQUIN GFR IS



             code = 1092) sq m                                   NOT AS ACCURATE

 AS



                                                                 CREATININE



                                                                 CLEARANCE IN



                                                                 PREDICTING



                                                                 GLOMERULAR



                                                                 FILTRATION RATE

.



                                                                 ESTIMATED GFR I

S



                                                                 NOT APPLICABLE 

FOR



                                                                 DIALYSIS PATIEN

TS.



[id number] 06:36:00





             Test Item    Value        Reference Range Interpretation Comments

 

             PHOSPHORUS (BEAKER) (test code = 3.1 mg/dL    2.3-4.7              

     



             604)                                                



[id number] 06:36:00





             Test Item    Value        Reference Range Interpretation Comments

 

             MAGNESIUM (BEAKER) (test code = 2.0 mg/dL    1.6-2.6               

    



             627)                                                



HEPATIC FUNCTION PANEL2018-10-04 06:36:00





             Test Item    Value        Reference Range Interpretation Comments

 

             TOTAL PROTEIN (BEAKER) (test code = 5.8 gm/dL    6.0-8.3      L    

        



             770)                                                

 

             ALBUMIN (BEAKER) (test code = 1145) 3.3 g/dL     3.5-5.0      L    

        

 

             BILIRUBIN TOTAL (BEAKER) (test code 0.4 mg/dL    0.2-1.2           

        



             = 377)                                              

 

             BILIRUBIN DIRECT (BEAKER) (test 0.2 mg/dL    0.1-0.5               

    



             code = 706)                                         

 

             ALKALINE PHOSPHATASE (BEAKER) (test 74 U/L                   

        



             code = 346)                                         

 

             AST (SGOT) (BEAKER) (test code = 11 U/L       5-34                 

     



             353)                                                

 

             ALT (SGPT) (BEAKER) (test code = 8 U/L        6-55                 

     



             347)                                                



CBC W/PLT COUNT &amp; AUTO DIFFERENTIAL2018-10-04 06:19:00





             Test Item    Value        Reference Range Interpretation Comments

 

             WHITE BLOOD CELL COUNT (BEAKER) 8.6 K/ L     3.5-10.5              

    



             (test code = 775)                                        

 

             RED BLOOD CELL COUNT (BEAKER) 4.66 M/ L    3.93-5.22               

  



             (test code = 761)                                        

 

             HEMOGLOBIN (BEAKER) (test code = 13.8 GM/DL   11.2-15.7            

     



             410)                                                

 

             HEMATOCRIT (BEAKER) (test code = 43.3 %       34.1-44.9            

     



             411)                                                

 

             MEAN CORPUSCULAR VOLUME (BEAKER) 92.9 fL      79.4-94.8            

     



             (test code = 753)                                        

 

             MEAN CORPUSCULAR HEMOGLOBIN 29.6 pg      25.6-32.2                 



             (BEAKER) (test code = 751)                                        

 

             MEAN CORPUSCULAR HEMOGLOBIN CONC 31.9 GM/DL   32.2-35.5    L       

     



             (BEAKER) (test code = 752)                                        

 

             RED CELL DISTRIBUTION WIDTH 14.6 %       11.7-14.4    H            



             (BEAKER) (test code = 412)                                        

 

             PLATELET COUNT (BEAKER) (test 271 K/CU MM  150-450                 

  



             code = 756)                                         

 

             MEAN PLATELET VOLUME (BEAKER) 11.1 fL      9.4-12.3                

  



             (test code = 754)                                        

 

             NUCLEATED RED BLOOD CELLS 0 /100 WBC   0-0                       



             (BEAKER) (test code = 413)                                        

 

             NEUTROPHILS RELATIVE PERCENT 58 %                                  

 



             (BEAKER) (test code = 429)                                        

 

             LYMPHOCYTES RELATIVE PERCENT 29 %                                  

 



             (BEAKER) (test code = 430)                                        

 

             MONOCYTES RELATIVE PERCENT 7 %                                    



             (BEAKER) (test code = 431)                                        

 

             EOSINOPHILS RELATIVE PERCENT 5 %                                   

 



             (BEAKER) (test code = 432)                                        

 

             BASOPHILS RELATIVE PERCENT 1 %                                    



             (BEAKER) (test code = 437)                                        

 

             NEUTROPHILS ABSOLUTE COUNT 4.97 K/ L    1.56-6.13                 



             (BEAKER) (test code = 670)                                        

 

             LYMPHOCYTES ABSOLUTE COUNT 2.45 K/ L    1.18-3.74                 



             (BEAKER) (test code = 414)                                        

 

             MONOCYTES ABSOLUTE COUNT (BEAKER) 0.64 K/ L    0.24-0.36    H      

      



             (test code = 415)                                        

 

             EOSINOPHILS ABSOLUTE COUNT 0.43 K/ L    0.04-0.36    H            



             (BEAKER) (test code = 416)                                        

 

             BASOPHILS ABSOLUTE COUNT (BEAKER) 0.07 K/ L    0.01-0.08           

      



             (test code = 417)                                        

 

             IMMATURE GRANULOCYTES-RELATIVE 1 %          0-1                    

   



             PERCENT (AKER) (test code =                                      

  



             2801)                                               



POCT-GLUCOSE [id number] 21:00:00





             Test Item    Value        Reference Range Interpretation Comments

 

             POC-GLUCOSE METER 219 mg/dL           H            TESTED AT 

[place] [address]



             ([facility]) (test code =                                        [facility]



             [address])                                               [address]



POCT-GLUCOSE METER2018-10-03 17:20:00





             Test Item    Value        Reference Range Interpretation Comments

 

             POC-GLUCOSE METER 363 mg/dL           H            Notified RICHAR ZIMMERMAN MD/TESTED



             ([facility]) (test code =                                        AT [facility] [address] [place] [address]



             [address])                                               [facility] [address]

0



PROTEIN ELECTROPHORESIS, SERUM2018-10-03 13:01:00





             Test Item    Value        Reference Range Interpretation Comments

 

             ALBUMIN FRACTION 2.9 g/dL     3.5-5.5      L            



             (AKER) (test code =                                        



             405)                                                

 

             ALPHA 1 FRACTION 0.2 g/dL     0.2-0.4                   



             (BEAKER) (test code =                                        



             389)                                                

 

             ALPHA 2 FRACTION 0.7 g/dL     0.5-0.9                   



             (BEAKER) (test code =                                        



             390)                                                

 

             BETA FRACTION (BEAKER) 0.9 g/dL     0.6-1.1                   



             (test code = 392)                                        

 

             GAMMA GLOBULIN 0.6 g/dL     0.7-1.7      L            



             FRACTION (BEAKER)                                        



             (test code = 391)                                        

 

             INTERPRETATION-119 Decreased albumin and                           



             (AKER) (test code = gamma globulins,                           



             2615)        suggestive of renal                           



                          protein loss and/or                           



                          protein-losing                           



                          enteropathy. No                           



                          monoclonal bands                           



                          detected.                              

 

             [id number] Hilda Gonzalez MD                        

   



             ([facility]) (test code = (electronic signature)                       

    



             8495)                                               

 

             PROTEIN TOTAL SERUM, 5.3 gm/dL    6.0-8.3      L            



             SPEP ([facility]) (test                                        



             code = 6293)                                        



POCT-GLUCOSE METER2018-10-03 12:17:00





             Test Item    Value        Reference Range Interpretation Comments

 

             POC-GLUCOSE METER 296 mg/dL           H            TESTED AT 

[place] [address]



             ([facility]) (test code =                                        [facility]



             1538)                                               [address]



ANTI-NUCLEAR ANTIBODY (DANIEL)2018-10-03 10:02:00





             Test Item    Value        Reference Range Interpretation Comments

 

             ANTI-NUCLEAR ANTIBODY (DANIEL) (BEAKER) Negative     Negative         

         



             (test code = 418)                                        



Test performed by IFA method.Test performed by IFA method.CALCIUM, IONIZED
2018-10-03 09:54:00





             Test Item    Value        Reference Range Interpretation Comments

 

             CALCIUM IONIZED (BEAKER) (test 1.17 mmol/L  1.12-1.27              

   



             code = 698)                                         

 

             PH, BLOOD (BEAKER) (test code = 7.39                               

    



             1810)                                               



POCT-GLUCOSE METER2018-10-03 08:40:00





             Test Item    Value        Reference Range Interpretation Comments

 

             POC-GLUCOSE METER 232 mg/dL           H            TESTED AT 

[facility] [address]



             (BEAKER) (test code =                                        [place]



             1538)                                               [address]



BASIC METABOLIC PANEL2018-10-03 06:08:00





             Test Item    Value        Reference Range Interpretation Comments

 

             SODIUM (BEAKER) 135 meq/L    136-145      L            



             (test code = 381)                                        

 

             POTASSIUM (BEAKER) 4.7 meq/L    3.5-5.1                   Specimen 

slightly



             (test code = 379)                                        hemolyzed

 

             CHLORIDE (BEAKER) 107 meq/L                        



             (test code = 382)                                        

 

             CO2 (BEAKER) (test 20 meq/L     22-29        L            



             code = 355)                                         

 

             BLOOD UREA NITROGEN 54 mg/dL     7-21         H            



             (BEAKER) (test code                                        



             = 354)                                              

 

             CREATININE (BEAKER) 2.02 mg/dL   0.57-1.25    H            Specimen

 slightly



             (test code = 358)                                        hemolyzed

 

             GLUCOSE RANDOM 224 mg/dL           H            



             (BEAKER) (test code                                        



             = 652)                                              

 

             CALCIUM (BEAKER) 9.2 mg/dL    8.4-10.2                  



             (test code = 697)                                        

 

             EGFR (BEAKER) (test 24 mL/min/1.73                           ESTIMA

JOAQUIN GFR IS



             code = 1092) sq m                                   NOT AS ACCURATE

 AS



                                                                 CREATININE



                                                                 CLEARANCE IN



                                                                 PREDICTING



                                                                 GLOMERULAR



                                                                 FILTRATION RATE

.



                                                                 ESTIMATED GFR I

S



                                                                 NOT APPLICABLE 

FOR



                                                                 DIALYSIS PATIEN

TS.



CBC W/PLT COUNT &amp; AUTO DIFFERENTIAL2018-10-03 05:55:00





             Test Item    Value        Reference Range Interpretation Comments

 

             WHITE BLOOD CELL COUNT (BEAKER) 7.4 K/ L     3.5-10.5              

    



             (test code = 775)                                        

 

             RED BLOOD CELL COUNT (BEAKER) 4.84 M/ L    3.93-5.22               

  



             (test code = 761)                                        

 

             HEMOGLOBIN (BEAKER) (test code = 14.4 GM/DL   11.2-15.7            

     



             410)                                                

 

             HEMATOCRIT (BEAKER) (test code = 46.6 %       34.1-44.9    H       

     



             411)                                                

 

             MEAN CORPUSCULAR VOLUME (BEAKER) 96.3 fL      79.4-94.8    H       

     



             (test code = 753)                                        

 

             MEAN CORPUSCULAR HEMOGLOBIN 29.8 pg      25.6-32.2                 



             (BEAKER) (test code = 751)                                        

 

             MEAN CORPUSCULAR HEMOGLOBIN CONC 30.9 GM/DL   32.2-35.5    L       

     



             (BEAKER) (test code = 752)                                        

 

             RED CELL DISTRIBUTION WIDTH 14.6 %       11.7-14.4    H            



             (BEAKER) (test code = 412)                                        

 

             PLATELET COUNT (BEAKER) (test 230 K/CU MM  150-450                 

  



             code = 756)                                         

 

             MEAN PLATELET VOLUME (BEAKER) 10.7 fL      9.4-12.3                

  



             (test code = 754)                                        

 

             NUCLEATED RED BLOOD CELLS 0 /100 WBC   0-0                       



             (BEAKER) (test code = 413)                                        

 

             NEUTROPHILS RELATIVE PERCENT 56 %                                  

 



             (BEAKER) (test code = 429)                                        

 

             LYMPHOCYTES RELATIVE PERCENT 30 %                                  

 



             (BEAKER) (test code = 430)                                        

 

             MONOCYTES RELATIVE PERCENT 8 %                                    



             (BEAKER) (test code = 431)                                        

 

             EOSINOPHILS RELATIVE PERCENT 5 %                                   

 



             (BEAKER) (test code = 432)                                        

 

             BASOPHILS RELATIVE PERCENT 1 %                                    



             (BEAKER) (test code = 437)                                        

 

             NEUTROPHILS ABSOLUTE COUNT 4.14 K/ L    1.56-6.13                 



             (BEAKER) (test code = 670)                                        

 

             LYMPHOCYTES ABSOLUTE COUNT 2.23 K/ L    1.18-3.74                 



             (BEAKER) (test code = 414)                                        

 

             MONOCYTES ABSOLUTE COUNT (BEAKER) 0.56 K/ L    0.24-0.36    H      

      



             (test code = 415)                                        

 

             EOSINOPHILS ABSOLUTE COUNT 0.34 K/ L    0.04-0.36                 



             (BEAKER) (test code = 416)                                        

 

             BASOPHILS ABSOLUTE COUNT (BEAKER) 0.08 K/ L    0.01-0.08           

      



             (test code = 417)                                        

 

             IMMATURE GRANULOCYTES-RELATIVE 0 %          0-1                    

   



             PERCENT (BEAKER) (test code =                                      

  



             2801)                                               



[id number] 05:52:00





             Test Item    Value        Reference Range Interpretation Comments

 

             MAGNESIUM (BEAKER) 2.2 mg/dL    1.6-2.6                   Specimen 

slightly



             (test code = 627)                                        hemolyzed



PHOSPHORUS2018-10-03 05:52:00





             Test Item    Value        Reference Range Interpretation Comments

 

             PHOSPHORUS (BEAKER) 3.5 mg/dL    2.3-4.7                   Specimen

 slightly



             (test code = 604)                                        hemolyzed



HEPATIC FUNCTION PANEL2018-10-03 05:52:00





             Test Item    Value        Reference Range Interpretation Comments

 

             TOTAL PROTEIN (BEAKER) 6.0 gm/dL    6.0-8.3                   Speci

men slightly



             (test code = 770)                                        hemolyzed

 

             ALBUMIN (BEAKER) (test 3.3 g/dL     3.5-5.0      L            Speci

men slightly



             code = 1145)                                        hemolyzed

 

             BILIRUBIN TOTAL 0.4 mg/dL    0.2-1.2                   Specimen sli

ghtly



             (BEAKER) (test code =                                        hemoly

zed



             377)                                                

 

             BILIRUBIN DIRECT 0.2 mg/dL    0.1-0.5                   Specimen sl

ightly



             (BEAKER) (test code =                                        hemoly

zed



             706)                                                

 

             ALKALINE PHOSPHATASE 78 U/L                           



             (BEAKER) (test code =                                        



             346)                                                

 

             AST (SGOT) (BEAKER) 11 U/L       5-34                      Specimen

 slightly



             (test code = 353)                                        hemolyzed

 

             ALT (SGPT) (BEAKER) 9 U/L        6-55                      Specimen

 slightly



             (test code = 347)                                        hemolyzed



MR, MRA, BRAIN, WITHOUT CONTRAST2018-10-03 04:28:00Reason for exam:-&gt;Ischemic
 Stroke EvaluationFINAL REPORT PATIENT ID:   [id number] CLINICAL HISTORY: Vertigo,
 episodic, peripheralIschemic Stroke Evaluation TECHNIQUE: MRA of the head 
utilizing 3-D time-of-flight technique, with 3-D reconstructions. MRA of the 
neck utilizing 2-D and 3-D time-of-flight technique, with 3-D reconstructions. 
COMPARISON: None Findings: MRA head: There is no evidence of intracranial 
aneurysm, focal stenosis, or major branch vessel occlusion.    MRA NeckThe 
origins of bilateral vertebral arteries are not well seen which may be in part 
related to technique. Distal vertebral arteries are widely patent. Stenosis of 
the origin of the right internal carotid artery, approximately 40% by NASCET 
criteria. The remainder of the right internal carotid artery and right common 
carotid artery are patent.The left carotid carotid artery in the neck is patent 
including their bifurcations.    IMPRESSION:No evidence for a major [place] proximal branch vessel occlusion. There is 40% stenosis of the right 
internal carotid artery origin by NASCET criteria. The origins of the bilateral 
vertebral arteries are not well seen likely secondary to technique. Signed: 
Johan Davis MDReport Verified Date/Time:  10/03/2018 04:28:41 Reading 
Location: [address] Reading Room  Electronically signed by: 
JOHAN DAVIS MD on 10/03/2018 04:28 AMMR, MRA, NECK, WITHOUT IV 
CONTRAST2018-10-03 04:28:00Reason for exam:-&gt;Ischemic Stroke EvaluationFINAL 
REPORT PATIENT ID:   [id number] CLINICAL HISTORY: Vertigo, episodic, 
peripheralIschemic Stroke Evaluation TECHNIQUE: MRA of the head utilizing 3-D 
time-of-flight technique, with 3-D reconstructions. MRA of the neck utilizing 2-
D and 3-D time-of-flight technique, with 3-D reconstructions. COMPARISON: None 
Findings: MRA head: There is no evidence of intracranial aneurysm, focal 
stenosis, or major branch vessel occlusion.    MRA NeckThe origins of bilateral 
vertebral arteries are not well seen which may be in part related to technique. 
Distal vertebral arteries are widely patent. Stenosis of the origin of the right
 internal carotid artery, approximately 40% by NASCET criteria. The remainder of
 the right internal carotid artery and right common carotid artery are 
patent.The left carotid carotid artery in the neck is patent including their 
bifurcations.    IMPRESSION:No evidence for a major [place] proximal 
branch vessel occlusion. There is 40% stenosis of the right internal carotid 
artery origin by NASCET criteria. The origins of the bilateral vertebral 
arteries are not well seen likely secondary to technique. Signed: Johan Davis MDReport Verified Date/Time:  10/03/2018 04:28:41 Reading Location: 
[address] Reading Room  Electronically signed by: JOHAN BOWMAN MD on 10/03/2018 04:28 AMRPR2018-10-03 01:59:00





             Test Item    Value        Reference Range Interpretation Comments

 

             RPR SCREEN (BEAKER) (test code = Nonreactive  Nonreactive          

     



             420)                                                



MR, BRAIN, WITHOUT CONTRAST2018-10-03 01:23:00Reason for exam:-&gt;rule out 
strokeFINAL REPORT PATIENT ID:   [id number] MRI Brain without contrast Clinical 
History: rule out strokevertigo, r/o posterior circulation stroke. Technique: 
MRI of the brain utilizing axial T2, FLAIR, GRE, DWI; sagittal and coronal T1-
weighted images. Comparisons: None Findings: There is no evidence of acute
infarct or hemorrhage. Pontine gliosis. Multiple bilateral T2 and FLAIR 
hyperintense periventricularand deep white matter foci likely represent chronic 
white matter microvascular disease.. Generalizedparenchymal volume loss with 
commensurate enlargement of CSF spaces and ventricles.  There is no hydr
ocephalus or midline shift. There are no extra-axial fluid collections. The 
craniocervical junction is preserved. The major intracranial flow-voids appear 
patent.  Minimal mucosal thickening in the left maxillary sinus. Bilateral 
mastoid air cell effusions. Intraorbital contents are unremarkable. No ag
gressive osseous or soft tissue lesions identified. IMPRESSION: No evidence of 
acute infarct, hemorrhage, or hydrocephalus. Involutional changes as described 
above. Signed: Johan Davis MDReportVerified Date/Time:  10/03/2018 
01:23:08 Reading Location: [address] Reading Room  Electronically 
signed by: JOHAN DAVIS MD on 10/03/2018 01:23 AMPOCT-GLUCOSE METER
2018-10-02 23:04:00





             Test Item    Value        Reference Range Interpretation Comments

 

             POC-GLUCOSE METER 279 mg/dL           H            TESTED AT 

[place] [address]



             ([facility]) (test code =                                        [facility]



             1538)                                               [id number]



POCT-GLUCOSE METER2018-10-02 23:04:00





             Test Item    Value        Reference Range Interpretation Comments

 

             POC-GLUCOSE METER 271 mg/dL           H            TESTED AT 

[place] [address]



             ([facility]) (test code =                                        [facility]



             1538)                                               [address]



HEMOGLOBIN A1C2018-10-02 12:42:00





             Test Item    Value        Reference Range Interpretation Comments

 

             HEMOGLOBIN A1C ([facility]) (test code = 13.1 %       4.3-6.1      H   

         



             368)                                                



POCT-GLUCOSE [id number] 12:02:00





             Test Item    Value        Reference Range Interpretation Comments

 

             POC-GLUCOSE METER 330 mg/dL           H            Notified IRCHAR ZIMMERMAN or MD



             ([facility]) (test code =                                        [facility] repeat



             1538)                                               test/TESTED AT 

[place] [address] [place]



                                                                 [address]



POCT-GLUCOSE METER2018-10-02 10:08:00





             Test Item    Value        Reference Range Interpretation Comments

 

             POC-GLUCOSE METER 258 mg/dL           H            TESTED AT 

[facility] [address]



             (BEAKER) (test code =                                        [place]



             1538)                                               [address]



VITAMIN B122018-10-02 08:39:00





             Test Item    Value        Reference Range Interpretation Comments

 

             VITAMIN B12 (BEAKER) (test code = 329 pg/mL    213-816             

      



             774)                                                



CREATINE KINASE (CK), TOTAL AND MB2018-10-02 08:21:00





             Test Item    Value        Reference Range Interpretation Comments

 

             CREATINE KINASE TOTAL (BEAKER) 28 U/L              L         

   



             (test code = 380)                                        

 

             CREATINE KINASE-MB (BEAKER) (test 0.9 ng/mL    0.0-6.6             

      



             code = 750)                                         

 

             CREATINE KINASE-MB INDEX (BEAKER) 3.2 %                            

      



             (test code = 395)                                        



CK-MB Reference Range:&lt;6.7      Normal6.7-10.0  Borderline&gt;10.0     
AbnormalTROPONIN -10-02 08:18:00





             Test Item    Value        Reference Range Interpretation Comments

 

             TROPONIN I (BEAKER) (test code = 0.02 ng/mL   0.00-0.03            

     



             397)                                                



Troponin I (TnI) levels must be interpreted in the context of the presenting 
symptoms and the clinical findings. Elevated TnI levels indicate myocardial 
damage, but are not specific for ischemic heart disease. Elevated TnI levels are
 seen in patients with other cardiac conditions (including myocarditis and 
congestive heart failure), and slight TnI elevations occur in patients with 
other conditions, including sepsis, renal failure, acidosis, acute neurological 
disease, and persistent tachyarrhythmia.URINALYSIS W/ [id number] 
06:11:00





             Test Item    Value        Reference Range Interpretation Comments

 

             COLOR (BEAKER) (test code = Yellow                                 



             470)                                                

 

             CLARITY (BEAKER) (test code = Hazy                                 

  



             469)                                                

 

             SPECIFIC GRAVITY UA (BEAKER) 1.010        1.001-1.035              

 



             (test code = 468)                                        

 

             PH UA (BEAKER) (test code = 5.5          5.0-8.0                   



             467)                                                

 

             PROTEIN UA (BEAKER) (test code 100 mg/dL    Negative     A         

   



             = 464)                                              

 

             GLUCOSE UA (BEAKER) (test code 300 mg/dL    Negative     A         

   



             = 365)                                              

 

             KETONES UA (BEAKER) (test code Negative     Negative               

   



             = 371)                                              

 

             BILIRUBIN UA (BEAKER) (test Negative     Negative                  



             code = 462)                                         

 

             BLOOD UA (BEAKER) (test code = Trace        Negative     A         

   



             461)                                                

 

             NITRITE UA (BEAKER) (test code Negative     Negative               

   



             = 465)                                              

 

             LEUKOCYTE ESTERASE UA (BEAKER) Large        Negative     A         

   



             (test code = 466)                                        

 

             UROBILINOGEN UA (BEAKER) (test 0.2 mg/dL    0.2-1.0                

   



             code = 463)                                         

 

             RBC UA (BEAKER) (test code = 2 /HPF                                

 



             519)                                                

 

             WBC UA (BEAKER) (test code = 251 /HPF                              

 



             520)                                                

 

             SQUAMOUS EPITHELIAL (BEAKER) 5 /HPF                                

 



             (test code = 516)                                        

 

             SOURCE(BEAKER) (test code = Urine, Voided                          

 



             7136)                                               



POCT-GLUCOSE [id number] 05:01:00





             Test Item    Value        Reference Range Interpretation Comments

 

             POC-GLUCOSE METER 326 mg/dL           H            TESTED AT 

[facility] [address]



             (BEAKER) (test code =                                        [facility]

[facility]



             1538)                                               [address]



TSH/FREE T4 IF INDICATED2018-10-02 03:24:00





             Test Item    Value        Reference Range Interpretation Comments

 

             THYROID STIMULATING HORMONE 2.25 uIU/mL  0.35-4.94                 



             (BEAKER) (test code = 772)                                        



CREATINE KINASE (CK), TOTAL AND MB2018-10-02 03:09:00





             Test Item    Value        Reference Range Interpretation Comments

 

             CREATINE KINASE TOTAL (BEAKER) 32 U/L                        

   



             (test code = 380)                                        

 

             CREATINE KINASE-MB (BEAKER) (test 1.0 ng/mL    0.0-6.6             

      



             code = 750)                                         

 

             CREATINE KINASE-MB INDEX (BEAKER) 3.1 %                            

      



             (test code = 395)                                        



CK-MB Reference Range:&lt;6.7      Normal6.7-10.0  Borderline&gt;10.0     
AbnormalTROPONIN -10-02 03:09:00





             Test Item    Value        Reference Range Interpretation Comments

 

             TROPONIN I (BEAKER) (test code = 0.02 ng/mL   0.00-0.03            

     



             397)                                                



Troponin I (TnI) levels must be interpreted in the context of the presenting 
symptoms and the clinical findings. Elevated TnI levels indicate myocardial 
damage, but are not specific for ischemic heart disease. Elevated TnI levels are
 seen in patients with other cardiac conditions (including myocarditis and 
congestive heart failure), and slight TnI elevations occur in patients with 
other conditions, including sepsis, renal failure, acidosis, acute neurological 
disease, and persistent tachyarrhythmia.BASIC METABOLIC PANEL2018-10-02 03:08:00





             Test Item    Value        Reference Range Interpretation Comments

 

             SODIUM (BEAKER) 137 meq/L    136-145                   



             (test code = 381)                                        

 

             POTASSIUM (BEAKER) 4.6 meq/L    3.5-5.1                   



             (test code = 379)                                        

 

             CHLORIDE (BEAKER) 105 meq/L                        



             (test code = 382)                                        

 

             CO2 (BEAKER) (test 22 meq/L     22-29                     



             code = 355)                                         

 

             BLOOD UREA NITROGEN 57 mg/dL     7-21         H            



             (BEAKER) (test code                                        



             = 354)                                              

 

             CREATININE (BEAKER) 1.98 mg/dL   0.57-1.25    H            



             (test code = 358)                                        

 

             GLUCOSE RANDOM 333 mg/dL           H            



             (BEAKER) (test code                                        



             = 652)                                              

 

             CALCIUM (BEAKER) 9.3 mg/dL    8.4-10.2                  



             (test code = 697)                                        

 

             EGFR (BEAKER) (test 25 mL/min/1.73                           ESTIMA

JOAQUIN GFR IS



             code = 1092) sq m                                   NOT AS ACCURATE

 AS



                                                                 CREATININE



                                                                 CLEARANCE IN



                                                                 PREDICTING



                                                                 GLOMERULAR



                                                                 FILTRATION RATE

.



                                                                 ESTIMATED GFR I

S



                                                                 NOT APPLICABLE 

FOR



                                                                 DIALYSIS PATIEN

TS.



[id number] 03:03:00





             Test Item    Value        Reference Range Interpretation Comments

 

             PHOSPHORUS (BEAKER) (test code = 2.9 mg/dL    2.3-4.7              

     



             604)                                                



MAGNESIUM2018-10-02 03:03:00





             Test Item    Value        Reference Range Interpretation Comments

 

             MAGNESIUM (BEAKER) (test code = 2.1 mg/dL    1.6-2.6               

    



             627)                                                



LIPID PANEL2018-10-02 03:03:00





             Test Item    Value        Reference Range Interpretation Comments

 

             TRIGLYCERIDES (BEAKER) (test code = 238 mg/dL                      

        



             540)                                                

 

             CHOLESTEROL (BEAKER) (test code = 151 mg/dL                        

      



             631)                                                

 

             HDL CHOLESTEROL (BEAKER) (test code 29 mg/dL                       

        



             = 976)                                              

 

             LDL CHOLESTEROL CALCULATED (BEAKER) 74 mg/dL                       

        



             (test code = 633)                                        



Triglyceride Reference Range:   Low Risk         &lt;150   Borderline    150-199
   High Risk     200-499   Very High Risk  &gt;=500Cholesterol Reference Range: 
  Low Risk         &lt;200   Borderline 200-239    High Risk        &gt;240HDL 
Cholesterol Reference Range:   Low Risk         &gt;=60   High Risk         
&lt;40LDL Cholesterol Reference Range:   Optimal          &lt;100   Near Optimal
  100-129   Borderline    130-159   High          160-189   Very High       
&gt;=190HEPATIC FUNCTION PANEL2018-10-02 03:03:00





             Test Item    Value        Reference Range Interpretation Comments

 

             TOTAL PROTEIN (BEAKER) (test code = 6.4 gm/dL    6.0-8.3           

        



             770)                                                

 

             ALBUMIN (BEAKER) (test code = 1145) 3.6 g/dL     3.5-5.0           

        

 

             BILIRUBIN TOTAL (BEAKER) (test code 0.4 mg/dL    0.2-1.2           

        



             = 377)                                              

 

             BILIRUBIN DIRECT (BEAKER) (test 0.2 mg/dL    0.1-0.5               

    



             code = 706)                                         

 

             ALKALINE PHOSPHATASE (BEAKER) (test 89 U/L                   

        



             code = 346)                                         

 

             AST (SGOT) (BEAKER) (test code = 12 U/L       5-34                 

     



             353)                                                

 

             ALT (SGPT) (BEAKER) (test code = 13 U/L       6-55                 

     



             347)                                                



C-REACTIVE PROTEIN2018-10-02 03:03:00





             Test Item    Value        Reference Range Interpretation Comments

 

             C-REACTIVE PROTEIN (BEAKER) (test 0.52 mg/dL   0.00-0.50    H      

      



             code = 676)                                         



CBC W/PLT COUNT &amp; AUTO DIFFERENTIAL2018-10-02 02:33:00





             Test Item    Value        Reference Range Interpretation Comments

 

             WHITE BLOOD CELL COUNT (BEAKER) 8.4 K/ L     3.5-10.5              

    



             (test code = 775)                                        

 

             RED BLOOD CELL COUNT (BEAKER) 4.75 M/ L    3.93-5.22               

  



             (test code = 761)                                        

 

             HEMOGLOBIN (BEAKER) (test code = 14.2 GM/DL   11.2-15.7            

     



             410)                                                

 

             HEMATOCRIT (BEAKER) (test code = 43.2 %       34.1-44.9            

     



             411)                                                

 

             MEAN CORPUSCULAR VOLUME (BEAKER) 90.9 fL      79.4-94.8            

     



             (test code = 753)                                        

 

             MEAN CORPUSCULAR HEMOGLOBIN 29.9 pg      25.6-32.2                 



             (BEAKER) (test code = 751)                                        

 

             MEAN CORPUSCULAR HEMOGLOBIN CONC 32.9 GM/DL   32.2-35.5            

     



             (BEAKER) (test code = 752)                                        

 

             RED CELL DISTRIBUTION WIDTH 14.5 %       11.7-14.4    H            



             (BEAKER) (test code = 412)                                        

 

             PLATELET COUNT (BEAKER) (test 280 K/CU MM  150-450                 

  



             code = 756)                                         

 

             MEAN PLATELET VOLUME (BEAKER) 10.9 fL      9.4-12.3                

  



             (test code = 754)                                        

 

             NUCLEATED RED BLOOD CELLS 0 /100 WBC   0-0                       



             (BEAKER) (test code = 413)                                        

 

             NEUTROPHILS RELATIVE PERCENT 57 %                                  

 



             (BEAKER) (test code = 429)                                        

 

             LYMPHOCYTES RELATIVE PERCENT 31 %                                  

 



             (BEAKER) (test code = 430)                                        

 

             MONOCYTES RELATIVE PERCENT 7 %                                    



             (BEAKER) (test code = 431)                                        

 

             EOSINOPHILS RELATIVE PERCENT 4 %                                   

 



             (BEAKER) (test code = 432)                                        

 

             BASOPHILS RELATIVE PERCENT 1 %                                    



             (BEAKER) (test code = 437)                                        

 

             NEUTROPHILS ABSOLUTE COUNT 4.77 K/ L    1.56-6.13                 



             (BEAKER) (test code = 670)                                        

 

             LYMPHOCYTES ABSOLUTE COUNT 2.58 K/ L    1.18-3.74                 



             (BEAKER) (test code = 414)                                        

 

             MONOCYTES ABSOLUTE COUNT (BEAKER) 0.57 K/ L    0.24-0.36    H      

      



             (test code = 415)                                        

 

             EOSINOPHILS ABSOLUTE COUNT 0.36 K/ L    0.04-0.36                 



             (BEAKER) (test code = 416)                                        

 

             BASOPHILS ABSOLUTE COUNT (BEAKER) 0.06 K/ L    0.01-0.08           

      



             (test code = 417)                                        

 

             IMMATURE GRANULOCYTES-RELATIVE 0 %          0-1                    

   



             PERCENT (BEAKER) (test code =                                      

  



             2801)                                               



POCT-GLUCOSE METER2018-10- 19:36:00





             Test Item    Value        Reference Range Interpretation Comments

 

             POC-GLUCOSE METER 365 mg/dL           H            Notified R

N MD/TESTED



             (BEAKER) (test code =                                        [facility] [address]



             [address]                                               [facility] [address]

0

## 2020-06-20 VITALS — TEMPERATURE: 97.9 F | DIASTOLIC BLOOD PRESSURE: 61 MMHG | SYSTOLIC BLOOD PRESSURE: 129 MMHG

## 2020-06-20 VITALS — OXYGEN SATURATION: 97 %

## 2020-06-20 LAB
ANISOCYTOSIS BLD QL: (no result)
BUN BLD-MCNC: 73 MG/DL (ref 7–18)
GLUCOSE SERPLBLD-MCNC: 454 MG/DL (ref 74–106)
HCT VFR BLD CALC: 39.5 % (ref 36–45)
LYMPHOCYTES # SPEC AUTO: 1 K/UL (ref 0.7–4.9)
MORPHOLOGY BLD-IMP: (no result)
PMV BLD: 9 FL (ref 7.6–11.3)
POTASSIUM SERPL-SCNC: 4.6 MMOL/L (ref 3.5–5.1)
RBC # BLD: 4.39 M/UL (ref 3.86–4.86)
URATE SERPL-MCNC: 5.6 MG/DL (ref 2.6–6)

## 2020-06-20 RX ADMIN — INSULIN GLARGINE SCH UNITS: 100 INJECTION, SOLUTION SUBCUTANEOUS at 09:21

## 2020-06-20 RX ADMIN — APIXABAN SCH MG: 2.5 TABLET, FILM COATED ORAL at 09:22

## 2020-06-20 NOTE — PN
Subjective:  Ms. Fowler was admitted on 06/19/2020 to the cath lab as an outpatient for catheterizatio
n because of unstable angina, positive stress test.  The catheterization showed patent multiple RCA s
tents.  She had a patent circumflex stent.  She had some mild-to-moderate disease in the distal RCA a
nd distal circumflex.  Her LAD, however, had multiple stenoses of 70% to 80% and 90% sequential lesio
ns after the 2nd diagonal.  Her blood vessel there was about 2 mm in size.  An angioplasty was done o
f all these lesions successfully.  Overnight, her right groin did not give her any issues.  It is non
tender.  She has good femoral pulses.  She has no chest pain.



Physical Examination:

Vital Signs:  Stable.  She is afebrile.  She is in a sinus rhythm.  Chest:  Clear.



Diagnostic Data:  Including BMP was pending.



Plan:  She was seen by Dr. Olivares yesterday, who will be monitoring her lab work.  She was also hydr
ated and given half-normal saline hydration.  She received a total of 3 Mucomyst dosages, 1 before th
e catheterization, 1 after the catheterization, and 1 this morning.  I am going to send her home on h
er home medications except for the addition of Plavix 75 mg daily.  She will resume her Eliquis as we
ll.  She will be on Plavix for 6 months, after which we will continue the Eliquis and possibly put he
r back on a baby aspirin.  She will come and see me in the office in 2 weeks.





NB/PETROS

DD:  06/20/2020 15:40:23Voice ID:  [id number]

DT:  06/20/2020 19:29:18Report ID:  [id number]